# Patient Record
Sex: FEMALE | Race: ASIAN | Employment: FULL TIME | URBAN - METROPOLITAN AREA
[De-identification: names, ages, dates, MRNs, and addresses within clinical notes are randomized per-mention and may not be internally consistent; named-entity substitution may affect disease eponyms.]

---

## 2020-04-22 ENCOUNTER — LAB ENCOUNTER (OUTPATIENT)
Dept: LAB | Facility: HOSPITAL | Age: 40
End: 2020-04-22
Attending: INTERNAL MEDICINE
Payer: COMMERCIAL

## 2020-04-22 DIAGNOSIS — R05.9 COUGH: ICD-10-CM

## 2020-04-23 ENCOUNTER — HOSPITAL ENCOUNTER (OUTPATIENT)
Dept: GENERAL RADIOLOGY | Age: 40
Discharge: HOME | End: 2020-04-23
Attending: INTERNAL MEDICINE
Payer: COMMERCIAL

## 2020-04-23 DIAGNOSIS — R05.9 COUGH: ICD-10-CM

## 2020-04-23 PROCEDURE — 71046 X-RAY EXAM CHEST 2 VIEWS: CPT | Performed by: INTERNAL MEDICINE

## 2020-05-23 ENCOUNTER — HOSPITAL ENCOUNTER (OUTPATIENT)
Dept: CT IMAGING | Facility: HOSPITAL | Age: 40
Discharge: HOME | End: 2020-05-23
Attending: INTERNAL MEDICINE
Payer: COMMERCIAL

## 2020-05-23 DIAGNOSIS — R07.9 CHEST PAIN, UNSPECIFIED TYPE: ICD-10-CM

## 2020-05-23 DIAGNOSIS — Q26.8 SCIMITAR SYNDROME: ICD-10-CM

## 2020-05-23 PROCEDURE — 82565 ASSAY OF CREATININE: CPT

## 2020-05-23 PROCEDURE — 71260 CT THORAX DX C+: CPT | Performed by: INTERNAL MEDICINE

## 2020-09-28 ENCOUNTER — HOSPITAL ENCOUNTER (OUTPATIENT)
Dept: MAMMOGRAPHY | Facility: HOSPITAL | Age: 40
Discharge: HOME | End: 2020-09-28
Attending: INTERNAL MEDICINE
Payer: COMMERCIAL

## 2020-09-28 DIAGNOSIS — Z12.31 ENCOUNTER FOR SCREENING MAMMOGRAM FOR MALIGNANT NEOPLASM OF BREAST: ICD-10-CM

## 2020-09-28 PROCEDURE — 77063 BREAST TOMOSYNTHESIS BI: CPT | Performed by: INTERNAL MEDICINE

## 2020-09-28 PROCEDURE — 77067 SCR MAMMO BI INCL CAD: CPT | Performed by: INTERNAL MEDICINE

## 2021-03-18 ENCOUNTER — OFFICE VISIT (OUTPATIENT)
Dept: OBGYN CLINIC | Facility: CLINIC | Age: 41
End: 2021-03-18
Payer: COMMERCIAL

## 2021-03-18 VITALS
WEIGHT: 164 LBS | SYSTOLIC BLOOD PRESSURE: 118 MMHG | DIASTOLIC BLOOD PRESSURE: 64 MMHG | HEIGHT: 63 IN | BODY MASS INDEX: 29.06 KG/M2 | HEART RATE: 76 BPM

## 2021-03-18 DIAGNOSIS — Z98.890 HISTORY OF OPEN HEART SURGERY: ICD-10-CM

## 2021-03-18 DIAGNOSIS — Z31.69 ENCOUNTER FOR PRECONCEPTION CONSULTATION: ICD-10-CM

## 2021-03-18 DIAGNOSIS — Z30.09 EVALUATION REGARDING CONTRACEPTION OPTIONS: Primary | ICD-10-CM

## 2021-03-18 PROCEDURE — 3008F BODY MASS INDEX DOCD: CPT | Performed by: OBSTETRICS & GYNECOLOGY

## 2021-03-18 PROCEDURE — 3074F SYST BP LT 130 MM HG: CPT | Performed by: OBSTETRICS & GYNECOLOGY

## 2021-03-18 PROCEDURE — 99203 OFFICE O/P NEW LOW 30 MIN: CPT | Performed by: OBSTETRICS & GYNECOLOGY

## 2021-03-18 PROCEDURE — 3078F DIAST BP <80 MM HG: CPT | Performed by: OBSTETRICS & GYNECOLOGY

## 2021-03-18 RX ORDER — MELATONIN 10 MG
10000 TABLET, SUBLINGUAL SUBLINGUAL DAILY
Status: ON HOLD | COMMUNITY
End: 2021-09-27

## 2021-03-18 RX ORDER — MELATONIN
1000 DAILY
COMMUNITY

## 2021-03-18 RX ORDER — MULTIVIT WITH MINERALS/LUTEIN
1000 TABLET ORAL DAILY
COMMUNITY

## 2021-03-18 RX ORDER — FLUTICASONE PROPIONATE 50 MCG
2 SPRAY, SUSPENSION (ML) NASAL DAILY
COMMUNITY

## 2021-03-18 RX ORDER — LEVOTHYROXINE SODIUM 0.05 MG/1
50 TABLET ORAL EVERY MORNING
COMMUNITY
Start: 2021-07-26

## 2021-03-18 RX ORDER — PSYLLIUM HUSK 0.4 G
1 CAPSULE ORAL DAILY
Status: ON HOLD | COMMUNITY
End: 2021-09-24 | Stop reason: CLARIF

## 2021-03-18 NOTE — PROGRESS NOTES
Renetta Mock is a 47year old female  Patient's last menstrual period was 2021 (exact date). Patient presents with: Other: fertility  . First Covid shot is on Saturday. She does not want to start trying pregnancy until the second shot. Paying Living Expenses:   Food Insecurity:       Worried About [address] [employer] in the Last Year:       Ran Out of Food in the Last Year:   Transportation Needs:       Lack of Transportation (Medical):       Lack of Transportation (Non-Medical):   Physi Systems:  Constitutional:  Denies fatigue, night sweats, hot flashes  Gastrointestinal:  denies heartburn, abdominal pain, diarrhea or constipation  Genitourinary:  denies dysuria, incontinence, abnormal vaginal discharge, vaginal itching  Skin/Breast:  Clifford Marshall

## 2021-08-12 ENCOUNTER — OFFICE VISIT (OUTPATIENT)
Dept: OBGYN CLINIC | Facility: CLINIC | Age: 41
End: 2021-08-12
Payer: COMMERCIAL

## 2021-08-12 ENCOUNTER — TELEPHONE (OUTPATIENT)
Dept: OBGYN CLINIC | Facility: CLINIC | Age: 41
End: 2021-08-12

## 2021-08-12 VITALS
WEIGHT: 159 LBS | HEART RATE: 91 BPM | HEIGHT: 63 IN | DIASTOLIC BLOOD PRESSURE: 80 MMHG | BODY MASS INDEX: 28.17 KG/M2 | SYSTOLIC BLOOD PRESSURE: 120 MMHG

## 2021-08-12 DIAGNOSIS — N92.6 IRREGULAR MENSES: Primary | ICD-10-CM

## 2021-08-12 PROCEDURE — 87591 N.GONORRHOEAE DNA AMP PROB: CPT | Performed by: OBSTETRICS & GYNECOLOGY

## 2021-08-12 PROCEDURE — 99213 OFFICE O/P EST LOW 20 MIN: CPT | Performed by: OBSTETRICS & GYNECOLOGY

## 2021-08-12 PROCEDURE — 87491 CHLMYD TRACH DNA AMP PROBE: CPT | Performed by: OBSTETRICS & GYNECOLOGY

## 2021-08-12 PROCEDURE — 3079F DIAST BP 80-89 MM HG: CPT | Performed by: OBSTETRICS & GYNECOLOGY

## 2021-08-12 PROCEDURE — 3074F SYST BP LT 130 MM HG: CPT | Performed by: OBSTETRICS & GYNECOLOGY

## 2021-08-12 PROCEDURE — 3008F BODY MASS INDEX DOCD: CPT | Performed by: OBSTETRICS & GYNECOLOGY

## 2021-08-12 NOTE — PROGRESS NOTES
He has been trying to get pregnant since March. Her periods are 25 to 30 days. She is doing the ovulation predictor kits and they are not turning. We will do a day 21 progesterone. Semen analysis will be done.   She did not have any trouble getting preg

## 2021-08-13 LAB
C TRACH DNA SPEC QL NAA+PROBE: NEGATIVE
N GONORRHOEA DNA SPEC QL NAA+PROBE: NEGATIVE

## 2021-08-21 LAB
PROGESTERONE: 5.5 NG/ML
PROLACTIN: 12.7 NG/ML
TSH W/REFLEX TO FT4: 2.12 MIU/L

## 2021-08-24 ENCOUNTER — TELEPHONE (OUTPATIENT)
Dept: OBGYN CLINIC | Facility: CLINIC | Age: 41
End: 2021-08-24

## 2021-08-24 NOTE — PROGRESS NOTES
Pt advised of results and recommendation for reproductive endo and clomid. Understanding verbalized. Has already picked up clomid, instructions clarified and reproductive endo providers info provided. Understanding verbalized.

## 2021-09-03 ENCOUNTER — TELEPHONE (OUTPATIENT)
Dept: OBGYN CLINIC | Facility: CLINIC | Age: 41
End: 2021-09-03

## 2021-09-03 DIAGNOSIS — O20.9 VAGINAL BLEEDING IN PREGNANCY, FIRST TRIMESTER: Primary | ICD-10-CM

## 2021-09-03 NOTE — TELEPHONE ENCOUNTER
Patient is 5w0d today (LMP7/), , experiencing some vaginal bleeding x 2 days and mild cramping. Patient denies history of miscarriage. Message routed to provider for recommendation.

## 2021-09-03 NOTE — TELEPHONE ENCOUNTER
Patient has an ultrasound scheduled for September 21st.  She has been bleeding slightly for the last couple of days. Should be seen sooner? Please advise. Thank you!

## 2021-09-05 ENCOUNTER — TELEPHONE (OUTPATIENT)
Dept: OBGYN CLINIC | Facility: CLINIC | Age: 41
End: 2021-09-05

## 2021-09-05 DIAGNOSIS — O36.80X0 PREGNANCY WITH INCONCLUSIVE FETAL VIABILITY, SINGLE OR UNSPECIFIED FETUS: Primary | ICD-10-CM

## 2021-09-05 LAB
HCG, TOTAL, QN: 120 MIU/ML
PROGESTERONE: 1.1 NG/ML

## 2021-09-05 NOTE — TELEPHONE ENCOUNTER
Discussed HCG level result consistent with early pregnancy. Patient was bleeding on Friday-Saturday and has slowed down now to similar to light period.  Recommended repeat HCG level tomorrow AM (48h after first level) to see if trend consistent with miscarr

## 2021-09-08 LAB — PROGESTERONE: 1.6 NG/ML

## 2021-09-10 DIAGNOSIS — O20.0 THREATENED MISCARRIAGE: Primary | ICD-10-CM

## 2021-09-10 DIAGNOSIS — O36.80X0 PREGNANCY WITH INCONCLUSIVE FETAL VIABILITY, SINGLE OR UNSPECIFIED FETUS: Primary | ICD-10-CM

## 2021-09-10 NOTE — PROGRESS NOTES
Patient aware of progesterone level results. She did not have the second HCG drawn and was recommended to do so but would like clarification if she needs both HCG and progesterone.  Her bleeding has stopped but would like to make sure levels are trending in

## 2021-09-12 LAB
HCG, TOTAL, QN: 1999 MIU/ML
PROGESTERONE: 1.9 NG/ML

## 2021-09-21 ENCOUNTER — INITIAL PRENATAL (OUTPATIENT)
Dept: OBGYN CLINIC | Facility: CLINIC | Age: 41
End: 2021-09-21
Payer: COMMERCIAL

## 2021-09-21 ENCOUNTER — LAB ENCOUNTER (OUTPATIENT)
Dept: LAB | Age: 41
End: 2021-09-21
Attending: OBSTETRICS & GYNECOLOGY
Payer: COMMERCIAL

## 2021-09-21 ENCOUNTER — TELEPHONE (OUTPATIENT)
Dept: OBGYN CLINIC | Facility: CLINIC | Age: 41
End: 2021-09-21

## 2021-09-21 ENCOUNTER — ULTRASOUND ENCOUNTER (OUTPATIENT)
Dept: OBGYN CLINIC | Facility: CLINIC | Age: 41
End: 2021-09-21
Payer: COMMERCIAL

## 2021-09-21 VITALS
DIASTOLIC BLOOD PRESSURE: 64 MMHG | HEART RATE: 94 BPM | HEIGHT: 63 IN | WEIGHT: 162.19 LBS | SYSTOLIC BLOOD PRESSURE: 102 MMHG | BODY MASS INDEX: 28.74 KG/M2

## 2021-09-21 DIAGNOSIS — O00.102 LEFT TUBAL PREGNANCY WITHOUT INTRAUTERINE PREGNANCY: ICD-10-CM

## 2021-09-21 DIAGNOSIS — O36.80X0 PREGNANCY OF UNKNOWN ANATOMIC LOCATION: Primary | ICD-10-CM

## 2021-09-21 DIAGNOSIS — Z92.21 HISTORY OF METHOTREXATE THERAPY: Primary | ICD-10-CM

## 2021-09-21 DIAGNOSIS — O00.102 LEFT TUBAL PREGNANCY, UNSPECIFIED WHETHER INTRAUTERINE PREGNANCY PRESENT: ICD-10-CM

## 2021-09-21 DIAGNOSIS — O36.80X0 PREGNANCY WITH INCONCLUSIVE FETAL VIABILITY, SINGLE OR UNSPECIFIED FETUS: Primary | ICD-10-CM

## 2021-09-21 LAB
ALBUMIN SERPL-MCNC: 3.7 G/DL (ref 3.4–5)
ALBUMIN/GLOB SERPL: 0.9 {RATIO} (ref 1–2)
ALP LIVER SERPL-CCNC: 68 U/L
ALT SERPL-CCNC: 38 U/L
ANION GAP SERPL CALC-SCNC: 3 MMOL/L (ref 0–18)
AST SERPL-CCNC: 24 U/L (ref 15–37)
B-HCG SERPL-ACNC: ABNORMAL MIU/ML
BASOPHILS # BLD AUTO: 0.06 X10(3) UL (ref 0–0.2)
BASOPHILS NFR BLD AUTO: 0.6 %
BILIRUB SERPL-MCNC: 0.4 MG/DL (ref 0.1–2)
BUN BLD-MCNC: 10 MG/DL (ref 7–18)
CALCIUM BLD-MCNC: 9.7 MG/DL (ref 8.5–10.1)
CHLORIDE SERPL-SCNC: 105 MMOL/L (ref 98–112)
CO2 SERPL-SCNC: 30 MMOL/L (ref 21–32)
CREAT BLD-MCNC: 0.82 MG/DL
EOSINOPHIL # BLD AUTO: 0.11 X10(3) UL (ref 0–0.7)
EOSINOPHIL NFR BLD AUTO: 1.1 %
ERYTHROCYTE [DISTWIDTH] IN BLOOD BY AUTOMATED COUNT: 13.1 %
GLOBULIN PLAS-MCNC: 3.9 G/DL (ref 2.8–4.4)
GLUCOSE BLD-MCNC: 78 MG/DL (ref 70–99)
HCT VFR BLD AUTO: 38.8 %
HGB BLD-MCNC: 12.9 G/DL
IMM GRANULOCYTES # BLD AUTO: 0.03 X10(3) UL (ref 0–1)
IMM GRANULOCYTES NFR BLD: 0.3 %
LYMPHOCYTES # BLD AUTO: 2.52 X10(3) UL (ref 1–4)
LYMPHOCYTES NFR BLD AUTO: 26.2 %
MCH RBC QN AUTO: 30.1 PG (ref 26–34)
MCHC RBC AUTO-ENTMCNC: 33.2 G/DL (ref 31–37)
MCV RBC AUTO: 90.7 FL
MONOCYTES # BLD AUTO: 0.73 X10(3) UL (ref 0.1–1)
MONOCYTES NFR BLD AUTO: 7.6 %
NEUTROPHILS # BLD AUTO: 6.18 X10 (3) UL (ref 1.5–7.7)
NEUTROPHILS # BLD AUTO: 6.18 X10(3) UL (ref 1.5–7.7)
NEUTROPHILS NFR BLD AUTO: 64.2 %
OSMOLALITY SERPL CALC.SUM OF ELEC: 284 MOSM/KG (ref 275–295)
PATIENT FASTING Y/N/NP: NO
PLATELET # BLD AUTO: 467 10(3)UL (ref 150–450)
POTASSIUM SERPL-SCNC: 4.5 MMOL/L (ref 3.5–5.1)
PROT SERPL-MCNC: 7.6 G/DL (ref 6.4–8.2)
RBC # BLD AUTO: 4.28 X10(6)UL
RH BLOOD TYPE: POSITIVE
SODIUM SERPL-SCNC: 138 MMOL/L (ref 136–145)
WBC # BLD AUTO: 9.6 X10(3) UL (ref 4–11)

## 2021-09-21 PROCEDURE — 84702 CHORIONIC GONADOTROPIN TEST: CPT | Performed by: OBSTETRICS & GYNECOLOGY

## 2021-09-21 PROCEDURE — 36415 COLL VENOUS BLD VENIPUNCTURE: CPT | Performed by: OBSTETRICS & GYNECOLOGY

## 2021-09-21 PROCEDURE — 3074F SYST BP LT 130 MM HG: CPT | Performed by: OBSTETRICS & GYNECOLOGY

## 2021-09-21 PROCEDURE — 86900 BLOOD TYPING SEROLOGIC ABO: CPT | Performed by: OBSTETRICS & GYNECOLOGY

## 2021-09-21 PROCEDURE — 76817 TRANSVAGINAL US OBSTETRIC: CPT | Performed by: OBSTETRICS & GYNECOLOGY

## 2021-09-21 PROCEDURE — 85025 COMPLETE CBC W/AUTO DIFF WBC: CPT | Performed by: OBSTETRICS & GYNECOLOGY

## 2021-09-21 PROCEDURE — 99214 OFFICE O/P EST MOD 30 MIN: CPT | Performed by: OBSTETRICS & GYNECOLOGY

## 2021-09-21 PROCEDURE — 3078F DIAST BP <80 MM HG: CPT | Performed by: OBSTETRICS & GYNECOLOGY

## 2021-09-21 PROCEDURE — 3008F BODY MASS INDEX DOCD: CPT | Performed by: OBSTETRICS & GYNECOLOGY

## 2021-09-21 PROCEDURE — 86901 BLOOD TYPING SEROLOGIC RH(D): CPT | Performed by: OBSTETRICS & GYNECOLOGY

## 2021-09-21 PROCEDURE — 80053 COMPREHEN METABOLIC PANEL: CPT | Performed by: OBSTETRICS & GYNECOLOGY

## 2021-09-21 RX ORDER — PRENATAL VIT/IRON FUM/FOLIC AC 28MG-0.8MG
1 TABLET ORAL DAILY
Status: ON HOLD | COMMUNITY
End: 2021-09-24 | Stop reason: CLARIF

## 2021-09-21 NOTE — TELEPHONE ENCOUNTER
Pt is calling looking for test results and to be advised about an injection she states. Please advise.

## 2021-09-21 NOTE — PROGRESS NOTES
Sussy Christianson is a 46year old female  Patient's last menstrual period was 2021. Patient presents with:  Prenatal Care: FOB  . Patient is about 7 weeks 4 days pregnant by LMP - h/o regular menses  Ultrasound today showed no IUP  OBSTETRICS HISTO Lack of Transportation (Non-Medical):  Not on file  Physical Activity:       Days of Exercise per Week: Not on file      Minutes of Exercise per Session: Not on file  Stress:       Feeling of Stress : Not on file  Social Connections:       Frequency of Comm (MULTIVITAL) Oral Tab, Take 1 tablet by mouth daily. , Disp: , Rfl:     ALLERGIES:    Metronidazole           RASH, HIVES  Barber                   RASH  Seasonal                Runny nose, Coughing      PHYSICAL EXAM:   Pelvic Exam:  External Genitalia: nor

## 2021-09-21 NOTE — TELEPHONE ENCOUNTER
Patient aware of recommendations for Methorexate, she has appointment schedule with [facility] tomorrow morning. Patient will have follow up HCG on day 4 and day 7 post injection. Order placed. Patient verbalizes understanding.

## 2021-09-22 ENCOUNTER — OFFICE VISIT (OUTPATIENT)
Dept: HEMATOLOGY/ONCOLOGY | Facility: HOSPITAL | Age: 41
End: 2021-09-22
Attending: OBSTETRICS & GYNECOLOGY
Payer: COMMERCIAL

## 2021-09-22 VITALS
SYSTOLIC BLOOD PRESSURE: 116 MMHG | HEART RATE: 85 BPM | DIASTOLIC BLOOD PRESSURE: 73 MMHG | RESPIRATION RATE: 18 BRPM | BODY MASS INDEX: 29 KG/M2 | WEIGHT: 162.81 LBS | TEMPERATURE: 98 F | OXYGEN SATURATION: 99 %

## 2021-09-22 DIAGNOSIS — O00.102 LEFT TUBAL PREGNANCY WITHOUT INTRAUTERINE PREGNANCY: Primary | ICD-10-CM

## 2021-09-22 PROCEDURE — 96401 CHEMO ANTI-NEOPL SQ/IM: CPT

## 2021-09-22 NOTE — TELEPHONE ENCOUNTER
Spoke with patient. She had her dose of methotrexate this morning. Order pending will route for her HCG to be done on Sunday 9/26/21 and Wednesday 9/29/21. Understanding verbalized.

## 2021-09-22 NOTE — PROGRESS NOTES
Education Record    Learner:  Patient    Disease / Diagnosis: Methotrexate injection for ectopic pregnancy.     Barriers / Limitations:  None   Comments:    Method:  Discussion   Comments:    General Topics:  Plan of care reviewed   Comments:    Outcome:  S

## 2021-09-24 ENCOUNTER — HOSPITAL ENCOUNTER (INPATIENT)
Facility: HOSPITAL | Age: 41
LOS: 3 days | Discharge: HOME | DRG: 817 | End: 2021-09-27
Attending: EMERGENCY MEDICINE | Admitting: OBSTETRICS & GYNECOLOGY
Payer: COMMERCIAL

## 2021-09-24 ENCOUNTER — ANESTHESIA (OUTPATIENT)
Dept: SURGERY | Facility: HOSPITAL | Age: 41
DRG: 817 | End: 2021-09-24
Payer: COMMERCIAL

## 2021-09-24 ENCOUNTER — ANESTHESIA EVENT (OUTPATIENT)
Dept: SURGERY | Facility: HOSPITAL | Age: 41
DRG: 817 | End: 2021-09-24
Payer: COMMERCIAL

## 2021-09-24 DIAGNOSIS — O00.90 ECTOPIC PREGNANCY: ICD-10-CM

## 2021-09-24 DIAGNOSIS — O00.90 RUPTURED ECTOPIC PREGNANCY: Primary | ICD-10-CM

## 2021-09-24 PROBLEM — O00.102 RUPTURED LEFT TUBAL ECTOPIC PREGNANCY CAUSING HEMOPERITONEUM: Status: ACTIVE | Noted: 2021-09-24

## 2021-09-24 PROBLEM — K66.1 RUPTURED LEFT TUBAL ECTOPIC PREGNANCY CAUSING HEMOPERITONEUM (HCC): Status: ACTIVE | Noted: 2021-09-24

## 2021-09-24 PROBLEM — Z92.89 HISTORY OF TRANSFUSION: Status: ACTIVE | Noted: 2021-09-24

## 2021-09-24 PROBLEM — K66.1 RUPTURED LEFT TUBAL ECTOPIC PREGNANCY CAUSING HEMOPERITONEUM: Status: ACTIVE | Noted: 2021-09-24

## 2021-09-24 PROBLEM — Z92.21 HISTORY OF METHOTREXATE THERAPY: Status: ACTIVE | Noted: 2021-09-24

## 2021-09-24 PROBLEM — Z87.74 HISTORY OF CONGENITAL HEART DISEASE: Status: ACTIVE | Noted: 2021-09-24

## 2021-09-24 PROBLEM — O00.102 RUPTURED LEFT TUBAL ECTOPIC PREGNANCY CAUSING HEMOPERITONEUM (HCC): Status: ACTIVE | Noted: 2021-09-24

## 2021-09-24 PROBLEM — Z31.69 ENCOUNTER FOR PRECONCEPTION CONSULTATION: Status: RESOLVED | Noted: 2021-03-18 | Resolved: 2021-09-24

## 2021-09-24 PROBLEM — Z98.890 STATUS POST EXPLORATORY LAPAROTOMY: Status: ACTIVE | Noted: 2021-09-24

## 2021-09-24 PROBLEM — D64.9 ANEMIA: Status: ACTIVE | Noted: 2021-09-24

## 2021-09-24 PROCEDURE — 0WCJ0ZZ EXTIRPATION OF MATTER FROM PELVIC CAVITY, OPEN APPROACH: ICD-10-PCS | Performed by: OBSTETRICS & GYNECOLOGY

## 2021-09-24 PROCEDURE — 76942 ECHO GUIDE FOR BIOPSY: CPT | Performed by: ANESTHESIOLOGY

## 2021-09-24 PROCEDURE — 99291 CRITICAL CARE FIRST HOUR: CPT | Performed by: INTERNAL MEDICINE

## 2021-09-24 PROCEDURE — 59120 TREAT ECTOPIC PREGNANCY: CPT | Performed by: OBSTETRICS & GYNECOLOGY

## 2021-09-24 PROCEDURE — 99255 IP/OBS CONSLTJ NEW/EST HI 80: CPT | Performed by: OBSTETRICS & GYNECOLOGY

## 2021-09-24 PROCEDURE — 30233N1 TRANSFUSION OF NONAUTOLOGOUS RED BLOOD CELLS INTO PERIPHERAL VEIN, PERCUTANEOUS APPROACH: ICD-10-PCS | Performed by: INTERNAL MEDICINE

## 2021-09-24 PROCEDURE — 30233H1 TRANSFUSION OF NONAUTOLOGOUS WHOLE BLOOD INTO PERIPHERAL VEIN, PERCUTANEOUS APPROACH: ICD-10-PCS | Performed by: INTERNAL MEDICINE

## 2021-09-24 PROCEDURE — 10T20ZZ RESECTION OF PRODUCTS OF CONCEPTION, ECTOPIC, OPEN APPROACH: ICD-10-PCS | Performed by: OBSTETRICS & GYNECOLOGY

## 2021-09-24 PROCEDURE — 0UJD0ZZ INSPECTION OF UTERUS AND CERVIX, OPEN APPROACH: ICD-10-PCS | Performed by: OBSTETRICS & GYNECOLOGY

## 2021-09-24 PROCEDURE — 99291 CRITICAL CARE FIRST HOUR: CPT | Performed by: NURSE PRACTITIONER

## 2021-09-24 RX ORDER — ALBUMIN, HUMAN INJ 5% 5 %
SOLUTION INTRAVENOUS
Status: COMPLETED
Start: 2021-09-24 | End: 2021-09-24

## 2021-09-24 RX ORDER — ACETAMINOPHEN 500 MG
1000 TABLET ORAL EVERY 6 HOURS PRN
Status: ON HOLD | COMMUNITY
End: 2021-09-27

## 2021-09-24 RX ORDER — HYDROCODONE BITARTRATE AND ACETAMINOPHEN 5; 325 MG/1; MG/1
1 TABLET ORAL EVERY 4 HOURS PRN
Status: DISCONTINUED | OUTPATIENT
Start: 2021-09-24 | End: 2021-09-27

## 2021-09-24 RX ORDER — MEPERIDINE HYDROCHLORIDE 25 MG/ML
12.5 INJECTION INTRAMUSCULAR; INTRAVENOUS; SUBCUTANEOUS AS NEEDED
Status: DISCONTINUED | OUTPATIENT
Start: 2021-09-24 | End: 2021-09-24 | Stop reason: HOSPADM

## 2021-09-24 RX ORDER — DEXTROSE, SODIUM CHLORIDE, SODIUM LACTATE, POTASSIUM CHLORIDE, AND CALCIUM CHLORIDE 5; .6; .31; .03; .02 G/100ML; G/100ML; G/100ML; G/100ML; G/100ML
INJECTION, SOLUTION INTRAVENOUS CONTINUOUS
Status: DISCONTINUED | OUTPATIENT
Start: 2021-09-24 | End: 2021-09-24

## 2021-09-24 RX ORDER — HYDROCODONE BITARTRATE AND ACETAMINOPHEN 5; 325 MG/1; MG/1
2 TABLET ORAL EVERY 4 HOURS PRN
Status: DISCONTINUED | OUTPATIENT
Start: 2021-09-24 | End: 2021-09-27

## 2021-09-24 RX ORDER — SODIUM CHLORIDE, SODIUM LACTATE, POTASSIUM CHLORIDE, CALCIUM CHLORIDE 600; 310; 30; 20 MG/100ML; MG/100ML; MG/100ML; MG/100ML
INJECTION, SOLUTION INTRAVENOUS CONTINUOUS PRN
Status: DISCONTINUED | OUTPATIENT
Start: 2021-09-24 | End: 2021-09-24 | Stop reason: SURG

## 2021-09-24 RX ORDER — PHENYLEPHRINE HCL 10 MG/ML
VIAL (ML) INJECTION AS NEEDED
Status: DISCONTINUED | OUTPATIENT
Start: 2021-09-24 | End: 2021-09-24 | Stop reason: SURG

## 2021-09-24 RX ORDER — LABETALOL HYDROCHLORIDE 5 MG/ML
5 INJECTION, SOLUTION INTRAVENOUS EVERY 5 MIN PRN
Status: DISCONTINUED | OUTPATIENT
Start: 2021-09-24 | End: 2021-09-24 | Stop reason: HOSPADM

## 2021-09-24 RX ORDER — ZOLPIDEM TARTRATE 5 MG/1
5 TABLET ORAL NIGHTLY PRN
Status: DISCONTINUED | OUTPATIENT
Start: 2021-09-24 | End: 2021-09-25

## 2021-09-24 RX ORDER — IBUPROFEN 200 MG
200 TABLET ORAL EVERY 4 HOURS PRN
Status: DISCONTINUED | OUTPATIENT
Start: 2021-09-24 | End: 2021-09-24

## 2021-09-24 RX ORDER — ONDANSETRON 2 MG/ML
4 INJECTION INTRAMUSCULAR; INTRAVENOUS AS NEEDED
Status: DISCONTINUED | OUTPATIENT
Start: 2021-09-24 | End: 2021-09-24 | Stop reason: HOSPADM

## 2021-09-24 RX ORDER — CEFAZOLIN SODIUM 1 G/3ML
INJECTION, POWDER, FOR SOLUTION INTRAMUSCULAR; INTRAVENOUS AS NEEDED
Status: DISCONTINUED | OUTPATIENT
Start: 2021-09-24 | End: 2021-09-24 | Stop reason: SURG

## 2021-09-24 RX ORDER — NALOXONE HYDROCHLORIDE 0.4 MG/ML
80 INJECTION, SOLUTION INTRAMUSCULAR; INTRAVENOUS; SUBCUTANEOUS AS NEEDED
Status: DISCONTINUED | OUTPATIENT
Start: 2021-09-24 | End: 2021-09-24 | Stop reason: HOSPADM

## 2021-09-24 RX ORDER — HYDROMORPHONE HYDROCHLORIDE 1 MG/ML
0.4 INJECTION, SOLUTION INTRAMUSCULAR; INTRAVENOUS; SUBCUTANEOUS EVERY 2 HOUR PRN
Status: DISCONTINUED | OUTPATIENT
Start: 2021-09-24 | End: 2021-09-27

## 2021-09-24 RX ORDER — SODIUM CHLORIDE, SODIUM LACTATE, POTASSIUM CHLORIDE, CALCIUM CHLORIDE 600; 310; 30; 20 MG/100ML; MG/100ML; MG/100ML; MG/100ML
INJECTION, SOLUTION INTRAVENOUS CONTINUOUS
Status: DISCONTINUED | OUTPATIENT
Start: 2021-09-24 | End: 2021-09-24 | Stop reason: HOSPADM

## 2021-09-24 RX ORDER — ALBUMIN, HUMAN INJ 5% 5 %
250 SOLUTION INTRAVENOUS ONCE
Status: COMPLETED | OUTPATIENT
Start: 2021-09-24 | End: 2021-09-24

## 2021-09-24 RX ORDER — ROCURONIUM BROMIDE 10 MG/ML
INJECTION, SOLUTION INTRAVENOUS AS NEEDED
Status: DISCONTINUED | OUTPATIENT
Start: 2021-09-24 | End: 2021-09-24 | Stop reason: SURG

## 2021-09-24 RX ORDER — ACETAMINOPHEN 325 MG/1
650 TABLET ORAL EVERY 4 HOURS PRN
Status: DISCONTINUED | OUTPATIENT
Start: 2021-09-24 | End: 2021-09-27

## 2021-09-24 RX ORDER — ONDANSETRON 2 MG/ML
4 INJECTION INTRAMUSCULAR; INTRAVENOUS EVERY 8 HOURS PRN
Status: DISCONTINUED | OUTPATIENT
Start: 2021-09-24 | End: 2021-09-27

## 2021-09-24 RX ORDER — HYDROMORPHONE HYDROCHLORIDE 1 MG/ML
0.8 INJECTION, SOLUTION INTRAMUSCULAR; INTRAVENOUS; SUBCUTANEOUS EVERY 2 HOUR PRN
Status: DISCONTINUED | OUTPATIENT
Start: 2021-09-24 | End: 2021-09-27

## 2021-09-24 RX ORDER — HYDROMORPHONE HYDROCHLORIDE 1 MG/ML
0.4 INJECTION, SOLUTION INTRAMUSCULAR; INTRAVENOUS; SUBCUTANEOUS EVERY 5 MIN PRN
Status: DISCONTINUED | OUTPATIENT
Start: 2021-09-24 | End: 2021-09-24 | Stop reason: HOSPADM

## 2021-09-24 RX ORDER — DEXAMETHASONE SODIUM PHOSPHATE 4 MG/ML
VIAL (ML) INJECTION AS NEEDED
Status: DISCONTINUED | OUTPATIENT
Start: 2021-09-24 | End: 2021-09-24 | Stop reason: SURG

## 2021-09-24 RX ORDER — HYDROCODONE BITARTRATE AND ACETAMINOPHEN 5; 325 MG/1; MG/1
2 TABLET ORAL AS NEEDED
Status: DISCONTINUED | OUTPATIENT
Start: 2021-09-24 | End: 2021-09-24 | Stop reason: HOSPADM

## 2021-09-24 RX ORDER — ONDANSETRON 2 MG/ML
INJECTION INTRAMUSCULAR; INTRAVENOUS AS NEEDED
Status: DISCONTINUED | OUTPATIENT
Start: 2021-09-24 | End: 2021-09-24 | Stop reason: SURG

## 2021-09-24 RX ORDER — ONDANSETRON 4 MG/1
4 TABLET, FILM COATED ORAL EVERY 8 HOURS PRN
Status: DISCONTINUED | OUTPATIENT
Start: 2021-09-24 | End: 2021-09-27

## 2021-09-24 RX ORDER — IBUPROFEN 600 MG/1
600 TABLET ORAL EVERY 4 HOURS PRN
Status: DISCONTINUED | OUTPATIENT
Start: 2021-09-24 | End: 2021-09-24

## 2021-09-24 RX ORDER — SODIUM CHLORIDE 9 MG/ML
INJECTION, SOLUTION INTRAVENOUS ONCE
Status: COMPLETED | OUTPATIENT
Start: 2021-09-24 | End: 2021-09-24

## 2021-09-24 RX ORDER — IBUPROFEN 400 MG/1
400 TABLET ORAL EVERY 4 HOURS PRN
Status: DISCONTINUED | OUTPATIENT
Start: 2021-09-24 | End: 2021-09-24

## 2021-09-24 RX ORDER — SODIUM CHLORIDE 9 MG/ML
INJECTION, SOLUTION INTRAVENOUS CONTINUOUS
Status: DISCONTINUED | OUTPATIENT
Start: 2021-09-24 | End: 2021-09-24

## 2021-09-24 RX ORDER — HYDROMORPHONE HYDROCHLORIDE 1 MG/ML
0.2 INJECTION, SOLUTION INTRAMUSCULAR; INTRAVENOUS; SUBCUTANEOUS EVERY 2 HOUR PRN
Status: DISCONTINUED | OUTPATIENT
Start: 2021-09-24 | End: 2021-09-27

## 2021-09-24 RX ORDER — HYDROCODONE BITARTRATE AND ACETAMINOPHEN 5; 325 MG/1; MG/1
1 TABLET ORAL AS NEEDED
Status: DISCONTINUED | OUTPATIENT
Start: 2021-09-24 | End: 2021-09-24 | Stop reason: HOSPADM

## 2021-09-24 RX ORDER — MIDAZOLAM HYDROCHLORIDE 1 MG/ML
1 INJECTION INTRAMUSCULAR; INTRAVENOUS EVERY 5 MIN PRN
Status: DISCONTINUED | OUTPATIENT
Start: 2021-09-24 | End: 2021-09-24 | Stop reason: HOSPADM

## 2021-09-24 RX ORDER — METOCLOPRAMIDE HYDROCHLORIDE 5 MG/ML
INJECTION INTRAMUSCULAR; INTRAVENOUS AS NEEDED
Status: DISCONTINUED | OUTPATIENT
Start: 2021-09-24 | End: 2021-09-24 | Stop reason: SURG

## 2021-09-24 RX ORDER — SODIUM CHLORIDE, SODIUM LACTATE, POTASSIUM CHLORIDE, CALCIUM CHLORIDE 600; 310; 30; 20 MG/100ML; MG/100ML; MG/100ML; MG/100ML
INJECTION, SOLUTION INTRAVENOUS CONTINUOUS
Status: DISCONTINUED | OUTPATIENT
Start: 2021-09-24 | End: 2021-09-27

## 2021-09-24 RX ORDER — CETIRIZINE HYDROCHLORIDE 10 MG/1
10 TABLET ORAL DAILY
COMMUNITY

## 2021-09-24 RX ADMIN — PHENYLEPHRINE HCL 200 MCG: 10 MG/ML VIAL (ML) INJECTION at 15:13:00

## 2021-09-24 RX ADMIN — ROCURONIUM BROMIDE 50 MG: 10 INJECTION, SOLUTION INTRAVENOUS at 15:25:00

## 2021-09-24 RX ADMIN — CEFAZOLIN SODIUM 2 G: 1 INJECTION, POWDER, FOR SOLUTION INTRAMUSCULAR; INTRAVENOUS at 15:22:00

## 2021-09-24 RX ADMIN — SODIUM CHLORIDE, SODIUM LACTATE, POTASSIUM CHLORIDE, CALCIUM CHLORIDE: 600; 310; 30; 20 INJECTION, SOLUTION INTRAVENOUS at 15:12:00

## 2021-09-24 RX ADMIN — DEXAMETHASONE SODIUM PHOSPHATE 8 MG: 4 MG/ML VIAL (ML) INJECTION at 15:25:00

## 2021-09-24 RX ADMIN — ONDANSETRON 4 MG: 2 INJECTION INTRAMUSCULAR; INTRAVENOUS at 16:16:00

## 2021-09-24 RX ADMIN — METOCLOPRAMIDE HYDROCHLORIDE 10 MG: 5 INJECTION INTRAMUSCULAR; INTRAVENOUS at 15:25:00

## 2021-09-24 NOTE — ANESTHESIA PROCEDURE NOTES
[facility]  Performed by: Young Matos MD  Authorized by: Young Matos MD       General Information and Staff    Start Time:   Anesthesiologist: Young Matos MD  Patient Location:  OR      Site Identification: real time ultrasound guided and image s

## 2021-09-24 NOTE — OPERATIVE REPORT
Operative Note    Preop diagnosis: Hemoperitoneum, ruptured ectopic, hemodynamically unstable    Postop diagnosis: Same  Procedure:  Exploratory laparotomy, evacuation of hemoperitoneum, left salpingectomy    Surgeon:  Zuleima Ramírez  Assistant:  Cezar Delarosa

## 2021-09-24 NOTE — ANESTHESIA PROCEDURE NOTES
Airway  Date/Time: 9/24/2021 3:15 PM  Urgency: elective      General Information and Staff    Patient location during procedure: OR  Anesthesiologist: Alondra Kim MD  Performed: anesthesiologist     Indications and Patient Condition  Indications for airw

## 2021-09-24 NOTE — ED INITIAL ASSESSMENT (HPI)
Pt had a syncopal episode. Pt was treated earlier in the week for an ectopic pregnancy in which she received methotrexate.

## 2021-09-24 NOTE — ANESTHESIA POSTPROCEDURE EVALUATION
[id number] [facility] Patient Status:  Inpatient   Age/Gender 46year old female MRN [id number]   [facility] SURGERY Attending Darío Colon, [address] [facility] Se Day # 0 PCP Louise Hermosillo MD       Anesthesia Post-op Note     EXPLORATORY LAPAROTOM

## 2021-09-24 NOTE — PROGRESS NOTES
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 Patient underwent open heart surgery at [facility] for this.         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

## 2021-09-24 NOTE — ANESTHESIA PROCEDURE NOTES
Arterial Line  Performed by: Sinan Lo MD  Authorized by: Sinan Lo MD     General Information and Staff    Procedure Start:   Anesthesiologist: Sinan Lo MD  Performed By:  Anesthesiologist  Patient Location:  OR  Indication: continuous bloo

## 2021-09-24 NOTE — ANESTHESIA PREPROCEDURE EVALUATION
PRE-OP EVALUATION    Patient Name: Juel Frankel    Admit Diagnosis: No admission diagnoses are documented for this encounter.     Pre-op Diagnosis: Ectopic pregnancy [O00.90]    EXPLORATORY LAPAROTOMY      Anesthesia Procedure: EXPLORATORY LAPAROTOMY  (N/A II  Mouth opening: >3 FB  TM distance: 4 - 6 cm  Neck ROM: full Cardiovascular    Cardiovascular exam normal.         Dental    No notable dental history.          Pulmonary    Pulmonary exam normal.                 Other findings            ASA: 2 and juan

## 2021-09-24 NOTE — PACU NOTE
Pt arrived from OR, with SBP 80's. Instructed by anesthesia to administer 500ml IV bolus as ordered. IVF bolus of 500ml administered. Anesthesia, Dr. Myles Sinks notified of results/ SBP readings.   Instructions to administer second IVF bolus of 500ml receive

## 2021-09-24 NOTE — CONSULTS
[facility]  Report of Consultation    Naomi Coles Patient Status:  Emergency    1980 MRN [id number]   Location [address] [facility] Attending Sandra Harley MD   Hosp Day # 0 PCP Juan José Tony MD     Reason for Consultation: history of STD's or PID. No gyn procedures.   Last PAP normal      OB History:       Allergies:    Metronidazole           RASH, HIVES  O'Brien                   RASH  Seasonal                Runny nose, Coughing    Medications:  No current facility-admi

## 2021-09-24 NOTE — ED PROVIDER NOTES
Patient Seen in: [facility] Emergency Department      History   Patient presents with: Other    Stated Complaint:     Subjective:   HPI    Patient is a 22-year-old female received methotrexate for a ectopic pregnancy 3 days ago.   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) at [facility]   • SALPINGECTOMY Left 09/23/2021    Ex-laparotomy, LEF other components within normal limits   HCG, BETA SUBUNIT (QUANT PREGNANCY TEST) - Abnormal; Notable for the following components:    Hcg Quantitative 4,394.0 (*)     All other components within normal limits   HEMOGLOBIN + HEMATOCRIT - Abnormal; Notable f Absolute 19.75 (*)     Lymphocyte Absolute 0.81 (*)     All other components within normal limits   RAPID SARS-COV-2 BY PCR - Normal   CBC WITH DIFFERENTIAL WITH PLATELET    Narrative:      The following orders were created for panel order CBC With Differen abdomen. She received fluids in the field by paramedics initially had a response. She is now with a systolic in the 48V. She received IV fluids and remains hypotensive. 2 units of O- ordered for transfusion.   OB emergently notified on the way to the ER

## 2021-09-25 PROBLEM — E03.8 SECONDARY HYPOTHYROIDISM: Status: ACTIVE | Noted: 2021-09-25

## 2021-09-25 RX ORDER — CALCIUM CARBONATE 500(1250)
500 TABLET ORAL 2 TIMES DAILY WITH MEALS
Status: DISCONTINUED | OUTPATIENT
Start: 2021-09-25 | End: 2021-09-27

## 2021-09-25 RX ORDER — POLYETHYLENE GLYCOL 3350 17 G/17G
17 POWDER, FOR SOLUTION ORAL DAILY PRN
Status: DISCONTINUED | OUTPATIENT
Start: 2021-09-25 | End: 2021-09-27

## 2021-09-25 RX ORDER — FLUTICASONE PROPIONATE 50 MCG
2 SPRAY, SUSPENSION (ML) NASAL NIGHTLY
Status: DISCONTINUED | OUTPATIENT
Start: 2021-09-25 | End: 2021-09-27

## 2021-09-25 RX ORDER — LEVOTHYROXINE SODIUM 0.05 MG/1
50 TABLET ORAL
Status: DISCONTINUED | OUTPATIENT
Start: 2021-09-25 | End: 2021-09-27

## 2021-09-25 RX ORDER — MELATONIN
3 NIGHTLY PRN
Status: DISCONTINUED | OUTPATIENT
Start: 2021-09-25 | End: 2021-09-27

## 2021-09-25 RX ORDER — DOCUSATE SODIUM 100 MG/1
100 CAPSULE, LIQUID FILLED ORAL 2 TIMES DAILY
Status: DISCONTINUED | OUTPATIENT
Start: 2021-09-25 | End: 2021-09-27

## 2021-09-25 RX ORDER — FLUTICASONE PROPIONATE 50 MCG
2 SPRAY, SUSPENSION (ML) NASAL DAILY
Status: DISCONTINUED | OUTPATIENT
Start: 2021-09-25 | End: 2021-09-25

## 2021-09-25 NOTE — PLAN OF CARE
Patient is alert and oriented. Complaining of mild to moderate lower abdominal incisional pain that becomes worse with movement. Abdominal dressing clean dry and intact. Controlled with Norco and Tylenol.  One dose dilauded given the first time the patient

## 2021-09-25 NOTE — PACU NOTE
Pt's SBP continues in 80\"s, Dr. Lu Anderson and Dr. Arminda Gloria notified. Orders received for pt to be admitted to ICU for observation, stat H& H, and consult intent/hospitalist on call. Report given to Miguel Angel Hines RN in ICU. Pt resting comfortably.  Denies any pain or

## 2021-09-25 NOTE — PROGRESS NOTES
[facility] Progress Note      Betito Solano Patient Status:  Inpatient    1980 MRN [id number]   [facility] 4SW-A Attending Nacho Daily MD   Hosp Day # 1 PCP Randy Garcia MD     Subjective:   Subjective:  Patient with left ectopic tubal pr murmur. Lungs: CTA. No R/W/R  Abdomen: Soft, horizontal surgical incision  Extremities: No edema  Neurologic: no focal deficits  Skin: Warm and dry.      Results:   Labs:    Hematology:  Recent Labs   Lab 09/21/21  1032 09/21/21  1032 09/24/21  1422 09/24/2 and transfuse additional PRBCs for Hb < 7  -Arterial line in place, wean Norepinephrine as able to maintain MAP  >65  3. Secondary hypothyroidism   -Synthroid   4.  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

## 2021-09-25 NOTE — PROGRESS NOTES
Visited patient this am. Per RN, Dr Otto Larios (patient's PCP) will be taking over patient's care from today. Will sign off.      Gino Dixon MD  [facility]

## 2021-09-25 NOTE — H&P
[facility]  History and Physical     Shaeovidiorachele Melania Patient Status:  Inpatient    1980 MRN [id number]   [facility] 4SW-A Attending Svitlana Almazan MD   Hosp Day # 0 PCP Leela Piña MD     Chief Complaint: Ruptured Ectopic Pregnan lung collapse in ~ 2013 and evaluation during this showed a congenital abnormality in the heart    • History of open heart surgery 2014    for Scimitar syndrome   • Hx of transfusion of packed red blood cells 09/24/2021    for ruptured ectopic pregnancy Paternal Grandfather    • Obesity Sister    • No Known Problems Daughter    • Thyroid disease Neg        Allergies:   Metronidazole           RASH, HIVES  Kerr                   RASH  Seasonal                Runny nose, Coughing    Medications:  Casie mcgill organomegaly. Neurologic: No focal neurological deficits. CNII-XII grossly intact. Musculoskeletal: Moves all extremities. Extremities: No edema or cyanosis. Integument: No rashes or lesions. Psychiatric: Appropriate mood and affect.       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 Baby ASA on hold  5.  Allergic Rhinitis - Flonase      Quality:  · DVT Prophylaxis: SCDs  · CODE status: Full  · Ballard: no  ·

## 2021-09-25 NOTE — CONSULTS
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) at [facility]   • SALPINGECTOMY Left 09/23/2021    Ex-laparotomy, LEFT salpingectomy, evacuation of hemoperitoneum for LEFT sided ruptured ectopic pregna Other Topics      Concerns:        Not on file    Social History Narrative      Not on file    Social Determinants of Health  Financial Resource Strain:       Difficulty of Paying Living Expenses: Not on file  Food Insecurity:       Worried About Running O 09/25/21 0752     PRN Medication:melatonin, acetaminophen **OR** HYDROcodone-acetaminophen **OR** HYDROcodone-acetaminophen, ondansetron **OR** ondansetron, HYDROmorphone HCl **OR** HYDROmorphone HCl **OR** HYDROmorphone HCl     REVIEW OF SYSTEMS:   GENERA clubbing, no cyanosis or edema.    Skin:  No rashes or lesions   Lymph nodes: Cervical, supraclavicular nodes normal.   Neurologic:  no focal deficits     Recent Results (from the past 24 hour(s))   Comp Metabolic Panel (14)    Collection Time: 09/24/21  2: Immature Granulocyte % 0.4 %   ABORH (Blood Type)    Collection Time: 09/24/21  2:22 PM   Result Value Ref Range    ABO BLOOD TYPE O     RH BLOOD TYPE Positive    Antibody Screen    Collection Time: 09/24/21  2:22 PM   Result Value Ref Range    Antibody Sc 10 7 - 18 mg/dL    Creatinine 0.62 0.55 - 1.02 mg/dL    Calcium, Total 7.2 (L) 8.5 - 10.1 mg/dL    Calculated Osmolality 286 275 - 295 mOsm/kg    GFR, Non- 114 >=60    GFR, -American 131 >=60    AST 16 15 - 37 U/L    ALT 28 13 - 56 U ABG Base Excess -6.1 (L) -2.0 - 2.0 mmol/L    ABG O2 Saturation 96 92 - 100 %    Calculated O2 Saturation 97 92 - 100 %    Patient Temperature 98.5 F    Total Hemoglobin 9.5 (L) 11.7 - 15.3 g/dL    Carboxyhemoglobin 1.1 0.0 - 3.0 % SAT    Methemoglobin 0.7 0.00 - 1.00 x10(3) uL    Neutrophil % 88.4 %    Lymphocyte % 6.6 %    Monocyte % 4.6 %    Eosinophil % 0.0 %    Basophil % 0.1 %    Immature Granulocyte % 0.3 %           ASSESSMENT/PLAN:    1. hypotension due to acute blood loss anemia - resolved   2.  Acu

## 2021-09-25 NOTE — PROGRESS NOTES
Clinic hours for Dr. Pollock:  Monday    In Surgery  Tuesday 7:30am - 4:30pm  Wednesday 7:30am - 4:30pm  Thursday       7:30am - 4:30pm  Friday  7:30 - 11am    If you need a refill on your prescription, please call your pharmacy and let them know. Please be proactive and call before your medication runs out. The pharmacy will then contact us for the refill. Please allow 24-48 hours for the refill to be processed.     If your Specialty Provider has ordered additional laboratory or radiology testing the results will be discussed at your next visit. This will allow you the opportunity to go over the results in person with your provider. If your results require immediate intervention, you will be contacted sooner by phone call.    You may be receiving a patient satisfaction survey in the mail or in your email.  If you receive an email survey, please look for the subject line of:   \" Your provider name\" would like your feedback\".   Please take the time to complete your survey either via the mail or email, as your feedback is very important to us.  We strive to make your experience exceptional and your comments help us with that goal.  We look forward to hearing from you.            GYN progress note    A/P: 46year old  now POD#1 s/p Ex-laparotomy, LEFT salpingectomy, evacuation of 700 mL hemoperitoneum for LEFT sided ruptured ectopic pregnancy. Admitted to ICU postop    Tiigi 34 on case. Appreciate help. 11.0 x10(3) uL 12.2 (H)     RBC      3.80 - 5.30 x10(6)uL 2.78 (L)     Hemoglobin      12.0 - 16.0 g/dL 8.0 (L) 9.0 (L)    Hematocrit      35.0 - 48.0 % 24.6 (L)     Platelet Count      157.7 - 450.0 10(3)uL 286.0     MCV      80.0 - 100.0 fL 88.5     MCH - 100 %   96   CALCULATED O2 SAT      92 - 100 %   97   PATIENT TEMPERATURE      F   98.5   TOTAL HEMOGLOBIN      11.7 - 15.3 g/dL   9.5 (L)   CARBOXYHEMOGLOBIN      0.0 - 3.0 % SAT   1.1   METHEMOGLOBIN      0.4 - 1.5 % SAT   0.7   ALLENS TEST         Not 275 - 295 mOsm/kg  286    eGFR NON-AFR.  AMERICAN      >=60  114    eGFR       >=60  131    AST (SGOT)      15 - 37 U/L  16    ALT (SGPT)      13 - 56 U/L  28    ALKALINE PHOSPHATASE      37 - 98 U/L  44    Total Bilirubin      0.1 - 2.0 mg/ Yes...

## 2021-09-25 NOTE — PLAN OF CARE
Received pt from PACU, s/p exploratory lap, patient is alert and oriented x 4, at first denies pain, during the night complains of incisional pain and dull, aching pain under right breast, patient stated she has chronic pain to right side since cabg surger for signs of decreased coronary artery perfusion - ex.  Angina  - Evaluate fluid balance, assess for edema, trend weights  Outcome: Progressing     Problem: GENITOURINARY - ADULT  Goal: Absence of urinary retention  Description: INTERVENTIONS:  - Assess pat

## 2021-09-26 RX ORDER — POTASSIUM CHLORIDE 20 MEQ/1
40 TABLET, EXTENDED RELEASE ORAL EVERY 4 HOURS
Status: COMPLETED | OUTPATIENT
Start: 2021-09-26 | End: 2021-09-26

## 2021-09-26 NOTE — PLAN OF CARE
Problem: Patient/Family Goals  Goal: Patient/Family Long Term Goal  Description: Patient's Long Term Goal: leave hospital    Interventions:  -   - See additional Care Plan goals for specific interventions  Outcome: Progressing  Goal: Patient/Family Short

## 2021-09-26 NOTE — PROGRESS NOTES
GYN progress note    A/P: 46year old  now POD#2 s/p ex-laparotomy, LEFT salpingectomy, evacuation of 700 mL hemoperitoneum for LEFT sided ruptured ectopic pregnancy. Admitted to ICU postop    Hospitalist, Intensivist, Pulmonologist on case.  Heather Velasco nontender, no edema,not currently wearing SCDs    Component      Latest Ref Rng & Units 9/26/2021 9/26/2021           5:20 AM  5:20 AM   WBC      4.0 - 11.0 x10(3) uL  11.4 (H)   RBC      3.80 - 5.30 x10(6)uL  2.53 (L)   Hemoglobin      12.0 - 16.0 g/dL 7.

## 2021-09-26 NOTE — PROGRESS NOTES
[facility] Progress Note      Juel Frankel Patient Status:  Inpatient    1980 MRN [id number]   [facility] 3NW-A Attending Milena Roberson MD   Hosp Day # 2 PCP Raenell Siemens, MD     Subjective:   Subjective:    ROS:  No SOB.   Medications:  • i °C)-99.1 °F (37.3 °C)] 98.1 °F (36.7 °C)  Pulse:  [66-91] 77  Resp:  [14-36] 18  BP: ()/() 93/60    General: In mild distress  HEENT: No focal deficits. Neck: supple. JVP normal  Cardiac: Regular rhythm, S1, S2 normal,  rub or gallop.   No murm hypothyroidism  Weakness    Slow drift in hemoglobin. Plan:  1. Ruptured Ectopic pregnancy s/p exploratory laparotomy and evacuation of hemoperitoneum on admission 9/24  -OB/GYN following  2.  Hemorrhagic shock, Acute blood loss Anemia   -Hemoglobin is sta

## 2021-09-26 NOTE — PLAN OF CARE
Patient alert and oriented and complaining of mild pain to lower abdominal incision. Dressing is clean dry and intact. Patient voided in bathroom and is eating breakfast up in the chair. Blood pressure is stable. Patient on room air.  Report was called to JOSE

## 2021-09-26 NOTE — PLAN OF CARE
Received patient sitting in the chair visiting with her , patient is in good spirit, pain down to 1/10, moves all extremities, incentive spirometry up to 1250 x 10. Patient walked to the bathroom, did urinate. Lower abdominal dressing clean, dry. Monitor labs and vital signs for trends  - Administer supportive blood products/factors, fluids and medications as ordered and appropriate  - Administer supportive blood products/factors as ordered and appropriate  Outcome: Progressing

## 2021-09-26 NOTE — PROGRESS NOTES
Transfer received earlier today from icu, ambulated in room, scds on when not up. Medicated for pain with good relief.  Pt has a small amt of vaginal bleeding

## 2021-09-27 VITALS
BODY MASS INDEX: 30.39 KG/M2 | WEIGHT: 171.5 LBS | HEART RATE: 69 BPM | SYSTOLIC BLOOD PRESSURE: 107 MMHG | OXYGEN SATURATION: 96 % | TEMPERATURE: 98 F | DIASTOLIC BLOOD PRESSURE: 64 MMHG | RESPIRATION RATE: 18 BRPM | HEIGHT: 63 IN

## 2021-09-27 RX ORDER — PSEUDOEPHEDRINE HCL 30 MG
100 TABLET ORAL 2 TIMES DAILY
Refills: 0 | Status: SHIPPED | COMMUNITY
Start: 2021-09-27

## 2021-09-27 RX ORDER — MELATONIN
Qty: 30 TABLET | Refills: 0 | Status: SHIPPED | COMMUNITY
Start: 2021-09-27

## 2021-09-27 RX ORDER — MELATONIN 10 MG
5000 TABLET, SUBLINGUAL SUBLINGUAL DAILY
Qty: 30 TABLET | Refills: 0 | Status: SHIPPED | COMMUNITY
Start: 2021-09-27

## 2021-09-27 RX ORDER — HYDROCODONE BITARTRATE AND ACETAMINOPHEN 5; 325 MG/1; MG/1
1-2 TABLET ORAL EVERY 4 HOURS PRN
Qty: 20 TABLET | Refills: 0 | Status: SHIPPED | OUTPATIENT
Start: 2021-09-27

## 2021-09-27 RX ORDER — ACETAMINOPHEN 500 MG
1000 TABLET ORAL EVERY 4 HOURS PRN
Refills: 0 | Status: SHIPPED | COMMUNITY
Start: 2021-09-27

## 2021-09-27 NOTE — PROGRESS NOTES
NURSING DISCHARGE NOTE    Discharged Home via Ambulatory. Accompanied by Family member  Belongings Taken by patient/family       . A&O x4. VSS. RA.   GI: Abdomen soft, non distended. Passing gas. Denies nausea.   : Voiding  Pain: controlled with PRN

## 2021-09-27 NOTE — PLAN OF CARE
Received patient awake and oriented. On room air, breath sounds clear. Up to the bathroom with assist, scant vaginal bleeding noted. Dressing to abdomen dry and intact. Medicated with Tylenol per request for c/o pain.

## 2021-09-27 NOTE — PROGRESS NOTES
POD#3  Patient tolerates diet, ambulates, pain is controlled    /64 (BP Location: Right arm)   Pulse 69   Temp 98.1 °F (36.7 °C) (Oral)   Resp 18   Ht 63\"   Wt 171 lb 8.3 oz (77.8 kg)   LMP 07/30/2021   SpO2 96%   BMI 30.38 kg/m²     Recent Labs   L

## 2021-09-27 NOTE — PAYOR COMM NOTE
--------------  ADMISSION REVIEW     Payor: Fredericka Fabry #:  [id number]  Authorization Number: [id number]    Admit date: 9/24/21  Admit time:  3:01 PM       REVIEW DOCUMENTATION:     ED Provider Notes      ED Provider Notes signed by Ashlie Rocha pregnancy on 9/2/2021 ->presented in hypovolemic shock with ruptured ectopic pregnancy 9/24/2021 -> to OR for ex-lap, left salpingectomy, evacuation of hemoperitoneum.  Dr Ignacio Mccallum, [facility].    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 No murmurs. Regular rate and rhythm. Abdomen: Distended abdomen. Diffuse tenderness. Extremities: No clubbing/cyanosis/edema. Skin: No rashes, no pallor  Neuro: Awake oriented ×3.   Nonfocal.  Good strength All other components within normal limits   CBC W/ DIFFERENTIAL - Abnormal; Notable for the following components:    WBC 16.2 (*)     RBC 3.18 (*)     HGB 9.6 (*)     HCT 29.5 (*)     Neutrophil Absolute Prelim 13.03 (*)     Neutrophil Absolute 13.03 (*) Screen[[id number]]                                  Final result                 Please view results for these tests on the individual orders.    PREPARE RBC   ABORH (BLOOD TYPE)   ANTIBODY SCREEN   PREPARE RBC   SURGICAL PATHOLOGY TISSUE   RAINBOW DRAW BLUE H&P - H&P Note      H&P signed by Pelon Beal DO at 9/25/2021 12:30 AM     Author: Pelon Beal DO Service: Hospitalist Author Type: Physician    Filed: 9/25/2021 12:30 AM Date of Service: 9/24/2021 10:25 PM Status: Signed    : Chucky Van heart surgery in 2014 she had issues with hypotension and was briefly on midodrine. No chest pain or pressure.     Past Medical History:  Past Medical History:   Diagnosis Date   • ANEMIA     only post op given iron supplements for 2 mos   • Collapsed lung drink alcohol and does not use drugs.     Family History:   Family History   Problem Relation Age of Onset   • Diabetes Father    • Hypertension Father    • Diabetes Mother    • Hypertension Mother    • No Known Problems Maternal Grandmother    • Diabetes M Respiratory: Clear to auscultation bilaterally. No wheezes. No rhonchi. Cardiovascular: S1, S2. Regular rate and rhythm. No murmurs, rubs or gallops. Equal pulses. Chest and Back: No tenderness or deformity. Abdomen: Soft, nontender, nondistended.   P following  2. Hemorrhagic shock, Acute blood loss Anemia   1. Hemoglobin is stable post transfusion (2 U PRBC in PACU), monitor and transfuse additional PRBCs for Hb < 7  2. Arterial line in place, wean Norepinephrine as able to maintain MAP  >65  3.  Hypot carbonate (OSCAL) tab 500 mg     Date Action Dose Route User    9/27/2021 0825 Given  Oral Nagi Ledbetter RN    9/26/2021 1630 Given  Oral Barbara Chaudhry RN      PEG 3350 [facility]) powder packet 17 g     Date Action Dose Route User    9/26/2021 1116 Given  Or Consultation:   hypovolemic shock, hemoperitoneum, probable ruptured ectopic     History of Present Illness:  Sussy Christianson is a a(n) 46year old female. , methotrexate 3 days ago. Pelvic pain since am and then passed out. US 9/21 left adnexal mass.  No IUP hemodynamically unstable     Postop diagnosis:           Same  Procedure:                      Exploratory laparotomy, evacuation of hemoperitoneum, left salpingectomy     Surgeon:                         Lloyd Villarreal  Assistant:                        Moishe Nageotte 100/74  AO: ()/(46-72) 102/72     • levothyroxine  50 mcg Oral Before breakfast   • Fluticasone Propionate  2 spray Each Nare Nightly   • docusate sodium  100 mg Oral BID         Exam:  Gen A&O, NAD, pale   CV RRR  Lungs CTAB  Abd soft, mild to moder Range     Glucose 137 (H) 70 - 99 mg/dL     Sodium 138 136 - 145 mmol/L     Potassium 3.8 3.5 - 5.1 mmol/L     Chloride 109 98 - 112 mmol/L     CO2 26.0 21.0 - 32.0 mmol/L     Anion Gap 3 0 - 18 mmol/L     BUN 9 7 - 18 mg/dL     Creatinine 0.90 0.55 - 1.02 Range     Antibody Screen Negative     Rapid SARS-CoV-2 by PCR     Collection Time: 09/24/21  2:32 PM     Specimen: Nares;  Other   Result Value Ref Range     Rapid SARS-CoV-2 by PCR Not Detected Not Detected   JOANNE DRAW BLUE     Collection Time: 09/24/2 >=60     GFR, -American 131 >=60     AST 16 15 - 37 U/L     ALT 28 13 - 56 U/L     Alkaline Phosphatase 44 37 - 98 U/L     Bilirubin, Total 0.3 0.1 - 2.0 mg/dL     Total Protein 5.4 (L) 6.4 - 8.2 g/dL     Albumin 2.8 (L) 3.4 - 5.0 g/dL     Globulin Patient Temperature 98.5 F     Total Hemoglobin 9.5 (L) 11.7 - 15.3 g/dL     Carboxyhemoglobin 1.1 0.0 - 3.0 % SAT     Methemoglobin 0.7 0.4 - 1.5 % SAT     Allens Test Not Applicable       Sample Site Arterial Line       ABG Device Room Air     Hemoglobin   Monocyte % 4.6 %     Eosinophil % 0.0 %     Basophil % 0.1 %     Immature Granulocyte % 0.3 %                  ASSESSMENT/PLAN:     1. hypotension due to acute blood loss anemia - resolved   2. Acute blood loss anemia due to ruptured ectopic.   No evide Hypovolemic shock ([address]    Hemoperitoneum due to rupture of left tubal ectopic pregnancy    Status post exploratory laparotomy    History of methotrexate therapy    History of transfusion    History of congenital heart disease-scimitar syndrome status post Reviewed she may still need another transfusion if she is feeling very poorly  -IV iron daily has been ordered. Received a dose today.      Pain controlled  -[place]      Advance diet as tolerated     SCDs, ambulation encouraged  Disposition: inpatient.  Poss < > = values in this interval not displayed.         Chemisty:          Recent Labs   Lab 09/21/21  1032 09/24/21  1422 09/24/21 1954 09/25/21  0518 09/26/21  0520    138 137 139 140   K 4.5 3.8 3.8 4.0 3.4*    109 113* 111 109   CO2 30.0 2

## 2021-09-27 NOTE — PLAN OF CARE
Problem: Patient/Family Goals  Goal: Patient/Family Long Term Goal  Description: Patient's Long Term Goal: leave hospital    Interventions:  -   - See additional Care Plan goals for specific interventions  Outcome: Adequate for Discharge  Goal: Patient/F stability  Description: INTERVENTIONS:  - Monitor vital signs, rhythm, and trends  - Monitor for bleeding, hypotension and signs of decreased cardiac output  - Evaluate effectiveness of vasoactive medications to optimize hemodynamic stability  - Monitor ar symptoms of bleeding or hemorrhage  - Monitor labs and vital signs for trends  - Administer supportive blood products/factors, fluids and medications as ordered and appropriate  - Administer supportive blood products/factors as ordered and appropriate  Out

## 2021-09-27 NOTE — DISCHARGE SUMMARY
[facility]  Discharge Summary    Marchia Carrel Patient Status:  Inpatient    1980 MRN [id number]   [facility] 3NW-A Attending Aamir uH MD   [address] PCP Michaela Keane MD     Date of Admission: 2021    Date of Discharge echocardiogram showed LVEF 60-65% she received 2 units of packed RBCs for 700 mL loss in hemoperitoneum. She required transfer to ICU because of severe hypotension after laparotomy and left salpingectomy for ruptured ectopic pregnancy.   2 doses of intrave daily. Levothyroxine Sodium 50 MCG Oral Tab  Take 50 mcg by mouth every morning. Take on an empty stomach    aspirin 81 MG Oral Tab  Take 81 mg by mouth daily. Follow up Visits: Follow-up with obstetrician     Follow up Labs: None ordered .     Other

## 2021-09-28 NOTE — PAYOR COMM NOTE
--------------  DISCHARGE REVIEW    Payor: Audelia Cleary #:  [id number]  Authorization Number: [id number]    Admit date: 9/24/21  Admit time:   3:01 PM  Discharge Date: 9/27/2021  3:39 PM     Admitting Physician: Felicitas Najjar, MD  Attending Ph

## 2021-10-13 ENCOUNTER — TELEPHONE (OUTPATIENT)
Dept: OBGYN CLINIC | Facility: CLINIC | Age: 41
End: 2021-10-13

## 2021-10-13 ENCOUNTER — TELEPHONE (OUTPATIENT)
Dept: OBGYN UNIT | Facility: HOSPITAL | Age: 41
End: 2021-10-13

## 2021-10-13 ENCOUNTER — OFFICE VISIT (OUTPATIENT)
Dept: OBGYN CLINIC | Facility: CLINIC | Age: 41
End: 2021-10-13
Payer: COMMERCIAL

## 2021-10-13 VITALS
DIASTOLIC BLOOD PRESSURE: 60 MMHG | WEIGHT: 159 LBS | SYSTOLIC BLOOD PRESSURE: 100 MMHG | HEIGHT: 63 IN | HEART RATE: 53 BPM | BODY MASS INDEX: 28.17 KG/M2

## 2021-10-13 DIAGNOSIS — O00.102 RUPTURED LEFT TUBAL ECTOPIC PREGNANCY CAUSING HEMOPERITONEUM: ICD-10-CM

## 2021-10-13 DIAGNOSIS — Z98.890 STATUS POST EXPLORATORY LAPAROTOMY: Primary | ICD-10-CM

## 2021-10-13 DIAGNOSIS — K66.1 RUPTURED LEFT TUBAL ECTOPIC PREGNANCY CAUSING HEMOPERITONEUM: ICD-10-CM

## 2021-10-13 DIAGNOSIS — D62 ACUTE BLOOD LOSS ANEMIA: ICD-10-CM

## 2021-10-13 PROCEDURE — 3008F BODY MASS INDEX DOCD: CPT | Performed by: STUDENT IN AN ORGANIZED HEALTH CARE EDUCATION/TRAINING PROGRAM

## 2021-10-13 PROCEDURE — 99024 POSTOP FOLLOW-UP VISIT: CPT | Performed by: STUDENT IN AN ORGANIZED HEALTH CARE EDUCATION/TRAINING PROGRAM

## 2021-10-13 PROCEDURE — 3078F DIAST BP <80 MM HG: CPT | Performed by: STUDENT IN AN ORGANIZED HEALTH CARE EDUCATION/TRAINING PROGRAM

## 2021-10-13 PROCEDURE — 3074F SYST BP LT 130 MM HG: CPT | Performed by: STUDENT IN AN ORGANIZED HEALTH CARE EDUCATION/TRAINING PROGRAM

## 2021-10-13 NOTE — PROGRESS NOTES
[address] [facility]  Obstetrics and Gynecology   Postoperative Progress Note    CC: Patient presents with:  Post-Op: exploratory laprascopy left salpingectomy on 9/24/21, if she takes a deep breathe, her left side hurts, says it's like a radiating pain 6 weeks off to recover from surgery.  Says she will call us if does not feel she is ready to return to work at the 4 week postoperative time point    RTC in 4 weeks (for 6 week postop visit)    Chang Deshpande MD

## 2021-10-13 NOTE — TELEPHONE ENCOUNTER
Fmla has been completed & signed. faxed to [facility]. Disability Claims @ [phone].

## 2021-10-18 ENCOUNTER — TELEPHONE (OUTPATIENT)
Dept: OBGYN CLINIC | Facility: CLINIC | Age: 41
End: 2021-10-18

## 2021-10-18 NOTE — TELEPHONE ENCOUNTER
Pt states she needs an appointment before Thursday in order for her insurance to approve her extension of STD. Pt was seen already for a 2wk post op and is scheduled for a 6wk follow up.   Pt requesting appointment with Dr. Pacheco Adjutant, advised pt she is out

## 2021-10-19 NOTE — PROGRESS NOTES
Pt to come to OV today. [employer] message sent. Rafael Michael,   It looks like your anemia has resolved. It was likely due to recent blood loss. We will plan to repeat blood work with your November post-op appointment.    Thanks,   [email or web address]

## 2021-10-20 ENCOUNTER — TELEPHONE (OUTPATIENT)
Dept: OBGYN CLINIC | Facility: CLINIC | Age: 41
End: 2021-10-20

## 2021-10-20 NOTE — TELEPHONE ENCOUNTER
Pt called to cancel her appointment. Pt states her  had surgery today and is at home but not up and moving yet. Pt states she will not be able to make it in and she does not need to reschedule.

## 2021-10-21 NOTE — PROGRESS NOTES
Spoke with pt. Asked if she had questions regarding her results. She did not have any questions. She needs a return to work for tomorrow. Note sent through [address] [place] [address] [address]. Pt aware. Verbalized understanding.

## 2021-11-08 ENCOUNTER — HOSPITAL ENCOUNTER (OUTPATIENT)
Age: 41
Discharge: HOME | End: 2021-11-08
Payer: COMMERCIAL

## 2021-11-08 VITALS
TEMPERATURE: 100 F | RESPIRATION RATE: 16 BRPM | HEART RATE: 100 BPM | OXYGEN SATURATION: 98 % | SYSTOLIC BLOOD PRESSURE: 108 MMHG | DIASTOLIC BLOOD PRESSURE: 69 MMHG | BODY MASS INDEX: 28.35 KG/M2 | WEIGHT: 160 LBS | HEIGHT: 63 IN

## 2021-11-08 DIAGNOSIS — K52.9 GASTROENTERITIS: Primary | ICD-10-CM

## 2021-11-08 PROCEDURE — 81002 URINALYSIS NONAUTO W/O SCOPE: CPT | Performed by: PHYSICIAN ASSISTANT

## 2021-11-08 PROCEDURE — 99203 OFFICE O/P NEW LOW 30 MIN: CPT | Performed by: PHYSICIAN ASSISTANT

## 2021-11-08 RX ORDER — ONDANSETRON 4 MG/1
4 TABLET, ORALLY DISINTEGRATING ORAL ONCE
Status: COMPLETED | OUTPATIENT
Start: 2021-11-08 | End: 2021-11-08

## 2021-11-08 RX ORDER — ONDANSETRON 4 MG/1
4 TABLET, ORALLY DISINTEGRATING ORAL EVERY 4 HOURS PRN
Qty: 10 TABLET | Refills: 0 | Status: SHIPPED | OUTPATIENT
Start: 2021-11-08 | End: 2021-11-15

## 2021-11-09 NOTE — ED INITIAL ASSESSMENT (HPI)
Patient reports nausea as well as lower abdominal pain and feeling chills and feverish since this morning. No appetite, fatigue, 2 episodes of diarrhea. Neg covid test at home. Denies any urinary concerns. abd pain is cramping in nature.

## 2021-11-09 NOTE — ED PROVIDER NOTES
Patient Seen in: [facility]mediate [address]      History   Patient presents with:  Abdomen/Flank Pain    Stated Complaint: diarrhea, abd pain, tired     Subjective:   HPI    44yo female presents to IC for evaluation for diarrhea, abd cramps and naus 2014   • OPEN HEART SURGICAL PROFILE      Open heart surgery for Scimitar Syndrome (congenital venous anomaly) at [facility] HISTORY  09/24/2021    exploratory laprascopy left salpingectomy, 8wks   • SALPINGECTOMY Left 09/23/2021    Ex- less than 2 seconds. Neurological:      Mental Status: She is alert and oriented to person, place, and time.    Psychiatric:         Mood and Affect: Mood normal.             ED Course     Labs Reviewed   POCT URINALYSIS DIPSTICK - Normal

## 2021-11-12 ENCOUNTER — OFFICE VISIT (OUTPATIENT)
Dept: OBGYN CLINIC | Facility: CLINIC | Age: 41
End: 2021-11-12
Payer: COMMERCIAL

## 2021-11-12 VITALS
HEART RATE: 80 BPM | BODY MASS INDEX: 28.49 KG/M2 | HEIGHT: 63 IN | SYSTOLIC BLOOD PRESSURE: 100 MMHG | WEIGHT: 160.81 LBS | DIASTOLIC BLOOD PRESSURE: 60 MMHG

## 2021-11-12 DIAGNOSIS — D75.839 THROMBOCYTOSIS: ICD-10-CM

## 2021-11-12 DIAGNOSIS — Z98.890 STATUS POST EXPLORATORY LAPAROTOMY: Primary | ICD-10-CM

## 2021-11-12 PROCEDURE — 3074F SYST BP LT 130 MM HG: CPT | Performed by: STUDENT IN AN ORGANIZED HEALTH CARE EDUCATION/TRAINING PROGRAM

## 2021-11-12 PROCEDURE — 3078F DIAST BP <80 MM HG: CPT | Performed by: STUDENT IN AN ORGANIZED HEALTH CARE EDUCATION/TRAINING PROGRAM

## 2021-11-12 PROCEDURE — 3008F BODY MASS INDEX DOCD: CPT | Performed by: STUDENT IN AN ORGANIZED HEALTH CARE EDUCATION/TRAINING PROGRAM

## 2021-11-12 PROCEDURE — 99024 POSTOP FOLLOW-UP VISIT: CPT | Performed by: STUDENT IN AN ORGANIZED HEALTH CARE EDUCATION/TRAINING PROGRAM

## 2021-11-12 NOTE — PROGRESS NOTES
[facility]  Obstetrics and Gynecology   Postpartum Progress Note    CC: Patient presents with:  Post-Op: EXPLORATORY LAPAROTOMY LEFT SALPINGECTOMY        Subjective:     Joel Vaughn is a 46year old  female who is s/p exploratory laparoto

## 2021-12-13 ENCOUNTER — TELEPHONE (OUTPATIENT)
Dept: OBGYN CLINIC | Facility: CLINIC | Age: 41
End: 2021-12-13

## 2022-04-15 LAB
ABSOLUTE BASOPHILS: 46 CELLS/UL (ref 0–200)
ABSOLUTE EOSINOPHILS: 162 CELLS/UL (ref 15–500)
ABSOLUTE LYMPHOCYTES: 2433 CELLS/UL (ref 850–3900)
ABSOLUTE MONOCYTES: 639 CELLS/UL (ref 200–950)
ABSOLUTE NEUTROPHILS: 4420 CELLS/UL (ref 1500–7800)
BASOPHILS: 0.6 %
EOSINOPHILS: 2.1 %
HEMATOCRIT: 39.4 % (ref 35–45)
HEMOGLOBIN: 13.2 G/DL (ref 11.7–15.5)
LYMPHOCYTES: 31.6 %
MCH: 29.9 PG (ref 27–33)
MCHC: 33.5 G/DL (ref 32–36)
MCV: 89.3 FL (ref 80–100)
MONOCYTES: 8.3 %
MPV: 8.9 FL (ref 7.5–12.5)
NEUTROPHILS: 57.4 %
PLATELET COUNT: 532 THOUSAND/UL (ref 140–400)
RDW: 11.9 % (ref 11–15)
RED BLOOD CELL COUNT: 4.41 MILLION/UL (ref 3.8–5.1)
WHITE BLOOD CELL COUNT: 7.7 THOUSAND/UL (ref 3.8–10.8)

## 2022-04-19 ENCOUNTER — TELEPHONE (OUTPATIENT)
Dept: HEMATOLOGY/ONCOLOGY | Facility: HOSPITAL | Age: 42
End: 2022-04-19

## 2022-04-19 NOTE — TELEPHONE ENCOUNTER
Patient calling to schedule consult with Hematology.     She is requesting 1st available     Referring Provider: Windy Davis   Diagnosis: D75.839 (ICD-10-CM) - 238.71 (ICD-9-CM) - Thrombocytosis   Thrombocytosis  -     CBC WITH DIFFERENTIAL WITH PLATELET  55/65/9674  notes and labs in [facility]

## 2022-04-25 ENCOUNTER — OFFICE VISIT (OUTPATIENT)
Dept: HEMATOLOGY/ONCOLOGY | Age: 42
End: 2022-04-25
Attending: INTERNAL MEDICINE
Payer: COMMERCIAL

## 2022-04-25 VITALS
WEIGHT: 163 LBS | OXYGEN SATURATION: 98 % | TEMPERATURE: 98 F | HEIGHT: 62.99 IN | BODY MASS INDEX: 28.88 KG/M2 | HEART RATE: 91 BPM | DIASTOLIC BLOOD PRESSURE: 75 MMHG | SYSTOLIC BLOOD PRESSURE: 111 MMHG

## 2022-04-25 DIAGNOSIS — D64.9 ANEMIA, UNSPECIFIED TYPE: Primary | ICD-10-CM

## 2022-04-25 DIAGNOSIS — D75.839 THROMBOCYTOSIS: ICD-10-CM

## 2022-04-25 LAB
ALBUMIN SERPL-MCNC: 3.8 G/DL (ref 3.4–5)
ALBUMIN/GLOB SERPL: 1 {RATIO} (ref 1–2)
ALP LIVER SERPL-CCNC: 77 U/L
ALT SERPL-CCNC: 31 U/L
ANION GAP SERPL CALC-SCNC: 6 MMOL/L (ref 0–18)
AST SERPL-CCNC: 21 U/L (ref 15–37)
BASOPHILS # BLD AUTO: 0.06 X10(3) UL (ref 0–0.2)
BASOPHILS NFR BLD AUTO: 0.6 %
BILIRUB SERPL-MCNC: 0.2 MG/DL (ref 0.1–2)
BUN BLD-MCNC: 10 MG/DL (ref 7–18)
CALCIUM BLD-MCNC: 9.6 MG/DL (ref 8.5–10.1)
CHLORIDE SERPL-SCNC: 105 MMOL/L (ref 98–112)
CO2 SERPL-SCNC: 26 MMOL/L (ref 21–32)
CREAT BLD-MCNC: 0.89 MG/DL
CRP SERPL-MCNC: 0.58 MG/DL (ref ?–0.3)
DEPRECATED HBV CORE AB SER IA-ACNC: 49.9 NG/ML
EOSINOPHIL # BLD AUTO: 0.13 X10(3) UL (ref 0–0.7)
EOSINOPHIL NFR BLD AUTO: 1.3 %
ERYTHROCYTE [DISTWIDTH] IN BLOOD BY AUTOMATED COUNT: 12.5 %
ERYTHROCYTE [SEDIMENTATION RATE] IN BLOOD: 15 MM/HR
FASTING STATUS PATIENT QL REPORTED: NO
GLOBULIN PLAS-MCNC: 3.9 G/DL (ref 2.8–4.4)
GLUCOSE BLD-MCNC: 96 MG/DL (ref 70–99)
HCT VFR BLD AUTO: 39.2 %
HGB BLD-MCNC: 13.2 G/DL
IMM GRANULOCYTES # BLD AUTO: 0.02 X10(3) UL (ref 0–1)
IMM GRANULOCYTES NFR BLD: 0.2 %
IRON SATN MFR SERPL: 11 %
IRON SERPL-MCNC: 40 UG/DL
LYMPHOCYTES # BLD AUTO: 2.1 X10(3) UL (ref 1–4)
LYMPHOCYTES NFR BLD AUTO: 21.1 %
MCH RBC QN AUTO: 29.9 PG (ref 26–34)
MCHC RBC AUTO-ENTMCNC: 33.7 G/DL (ref 31–37)
MCV RBC AUTO: 88.7 FL
MONOCYTES # BLD AUTO: 0.71 X10(3) UL (ref 0.1–1)
MONOCYTES NFR BLD AUTO: 7.1 %
NEUTROPHILS # BLD AUTO: 6.93 X10 (3) UL (ref 1.5–7.7)
NEUTROPHILS # BLD AUTO: 6.93 X10(3) UL (ref 1.5–7.7)
NEUTROPHILS NFR BLD AUTO: 69.7 %
OSMOLALITY SERPL CALC.SUM OF ELEC: 283 MOSM/KG (ref 275–295)
PLATELET # BLD AUTO: 500 10(3)UL (ref 150–450)
POTASSIUM SERPL-SCNC: 4.1 MMOL/L (ref 3.5–5.1)
PROT SERPL-MCNC: 7.7 G/DL (ref 6.4–8.2)
RBC # BLD AUTO: 4.42 X10(6)UL
SODIUM SERPL-SCNC: 137 MMOL/L (ref 136–145)
TIBC SERPL-MCNC: 370 UG/DL (ref 240–450)
TRANSFERRIN SERPL-MCNC: 248 MG/DL (ref 200–360)
WBC # BLD AUTO: 10 X10(3) UL (ref 4–11)

## 2022-04-25 PROCEDURE — 99205 OFFICE O/P NEW HI 60 MIN: CPT | Performed by: INTERNAL MEDICINE

## 2022-04-25 NOTE — PROGRESS NOTES
Education Record    Learner:  Patient    Disease / Diagnosis:thrombocytosis     Barriers / Limitations:  None   Comments:    Method:  Discussion   Comments:    General Topics:  Plan of care reviewed   Comments:    Outcome:  Shows understanding   Comments:    Patient here as a new consult. States energy levels are fair. denies any signs of active bleeding or heavy menses. Denies any lightheadedness or dyspnea.  Has had IV iron in the past.

## 2022-05-03 ENCOUNTER — APPOINTMENT (OUTPATIENT)
Dept: HEMATOLOGY/ONCOLOGY | Facility: HOSPITAL | Age: 42
End: 2022-05-03
Attending: INTERNAL MEDICINE
Payer: COMMERCIAL

## 2022-05-03 ENCOUNTER — TELEPHONE (OUTPATIENT)
Dept: HEMATOLOGY/ONCOLOGY | Facility: HOSPITAL | Age: 42
End: 2022-05-03

## 2022-05-09 ENCOUNTER — TELEPHONE (OUTPATIENT)
Dept: HEMATOLOGY/ONCOLOGY | Age: 42
End: 2022-05-09

## 2022-05-09 LAB — JAK2 GENE, V617F MUTATION, QUALITATIVE: NOT DETECTED

## 2022-05-10 NOTE — TELEPHONE ENCOUNTER
Spoke with patient. She verbalized understanding. The JAK2 is negative. There is another test pending but I doubt it would be positive.

## 2022-05-12 LAB — CALR EXON 9 MUTATION ANALYSIS - RESULT: NOT DETECTED

## 2022-05-19 LAB — MPL CODON 515 RESULTS: NOT DETECTED

## 2022-07-07 LAB — CYTOLOGY CVX/VAG DOC THIN PREP: NORMAL

## 2022-08-26 ENCOUNTER — HOSPITAL ENCOUNTER (OUTPATIENT)
Dept: MAMMOGRAPHY | Facility: HOSPITAL | Age: 42
Discharge: HOME | End: 2022-08-26
Attending: INTERNAL MEDICINE
Payer: COMMERCIAL

## 2022-08-26 DIAGNOSIS — Z12.31 ENCOUNTER FOR SCREENING MAMMOGRAM FOR MALIGNANT NEOPLASM OF BREAST: ICD-10-CM

## 2022-09-07 ENCOUNTER — HOSPITAL ENCOUNTER (OUTPATIENT)
Dept: MAMMOGRAPHY | Facility: HOSPITAL | Age: 42
Discharge: HOME | End: 2022-09-07
Attending: STUDENT IN AN ORGANIZED HEALTH CARE EDUCATION/TRAINING PROGRAM
Payer: COMMERCIAL

## 2022-09-07 DIAGNOSIS — Z12.31 SCREENING MAMMOGRAM FOR BREAST CANCER: ICD-10-CM

## 2022-09-07 PROCEDURE — 77067 SCR MAMMO BI INCL CAD: CPT | Performed by: STUDENT IN AN ORGANIZED HEALTH CARE EDUCATION/TRAINING PROGRAM

## 2022-09-07 PROCEDURE — 77063 BREAST TOMOSYNTHESIS BI: CPT | Performed by: STUDENT IN AN ORGANIZED HEALTH CARE EDUCATION/TRAINING PROGRAM

## 2023-02-09 ENCOUNTER — TELEPHONE (OUTPATIENT)
Dept: FAMILY MEDICINE | Age: 43
End: 2023-02-09

## 2023-02-09 ENCOUNTER — OFFICE VISIT (OUTPATIENT)
Dept: INTERNAL MEDICINE | Age: 43
End: 2023-02-09

## 2023-02-09 VITALS
WEIGHT: 167 LBS | OXYGEN SATURATION: 98 % | HEART RATE: 74 BPM | SYSTOLIC BLOOD PRESSURE: 114 MMHG | TEMPERATURE: 98.4 F | BODY MASS INDEX: 29.59 KG/M2 | RESPIRATION RATE: 16 BRPM | DIASTOLIC BLOOD PRESSURE: 79 MMHG | HEIGHT: 63 IN

## 2023-02-09 DIAGNOSIS — J30.9 ALLERGIC RHINITIS, UNSPECIFIED SEASONALITY, UNSPECIFIED TRIGGER: ICD-10-CM

## 2023-02-09 DIAGNOSIS — R63.5 WEIGHT GAIN: ICD-10-CM

## 2023-02-09 DIAGNOSIS — K21.9 GASTROESOPHAGEAL REFLUX DISEASE, UNSPECIFIED WHETHER ESOPHAGITIS PRESENT: ICD-10-CM

## 2023-02-09 DIAGNOSIS — D75.839 THROMBOCYTOSIS: ICD-10-CM

## 2023-02-09 DIAGNOSIS — R73.9 HYPERGLYCEMIA: ICD-10-CM

## 2023-02-09 DIAGNOSIS — E03.9 HYPOTHYROIDISM (ACQUIRED): Primary | ICD-10-CM

## 2023-02-09 PROCEDURE — 99204 OFFICE O/P NEW MOD 45 MIN: CPT | Performed by: INTERNAL MEDICINE

## 2023-02-09 RX ORDER — ASCORBIC ACID, CHOLECALCIFEROL, .ALPHA.-TOCOPHEROL ACETATE, DL-, PYRIDOXINE, FOLIC ACID, CYANOCOBALAMIN, CALCIUM, FERROUS FUMARATE, MAGNESIUM, DOCONEXENT 85; 200; 10; 25; 1; 12; 140; 27; 45; 300 [IU]/1; [IU]/1; [IU]/1; [IU]/1; MG/1; UG/1; MG/1; MG/1; MG/1; MG/1
1 CAPSULE, GELATIN COATED ORAL DAILY
COMMUNITY
Start: 2022-12-19 | End: 2023-02-09

## 2023-02-09 RX ORDER — FLUTICASONE PROPIONATE 50 MCG
SPRAY, SUSPENSION (ML) NASAL
COMMUNITY
Start: 2023-02-07 | End: 2023-02-09 | Stop reason: SDUPTHER

## 2023-02-09 RX ORDER — LEVOTHYROXINE SODIUM 0.05 MG/1
TABLET ORAL
COMMUNITY
Start: 2022-12-30 | End: 2023-02-19 | Stop reason: SDUPTHER

## 2023-02-09 RX ORDER — FLUTICASONE PROPIONATE 50 MCG
2 SPRAY, SUSPENSION (ML) NASAL DAILY
Qty: 48 G | Refills: 1 | Status: SHIPPED | OUTPATIENT
Start: 2023-02-09 | End: 2023-06-12

## 2023-02-09 RX ORDER — PANTOPRAZOLE SODIUM 40 MG/1
40 TABLET, DELAYED RELEASE ORAL DAILY
Qty: 30 TABLET | Refills: 2 | Status: SHIPPED | OUTPATIENT
Start: 2023-02-09 | End: 2023-04-05 | Stop reason: SDUPTHER

## 2023-02-09 ASSESSMENT — ENCOUNTER SYMPTOMS
DIARRHEA: 0
LIGHT-HEADEDNESS: 0
WHEEZING: 0
FEVER: 0
TROUBLE SWALLOWING: 0
EYE PAIN: 0
BACK PAIN: 0
COLOR CHANGE: 0
SINUS PRESSURE: 0
NAUSEA: 0
EYE REDNESS: 0
DIZZINESS: 0
ABDOMINAL DISTENTION: 0
BLOOD IN STOOL: 0
UNEXPECTED WEIGHT CHANGE: 0
APPETITE CHANGE: 0
CONSTIPATION: 0
COUGH: 0
NUMBNESS: 0
CHEST TIGHTNESS: 0
WEAKNESS: 0
SHORTNESS OF BREATH: 0
ABDOMINAL PAIN: 0
FATIGUE: 0
NERVOUS/ANXIOUS: 0
ROS GI COMMENTS: GERD
SORE THROAT: 0
EYE DISCHARGE: 0
HEADACHES: 0

## 2023-02-09 ASSESSMENT — PATIENT HEALTH QUESTIONNAIRE - PHQ9
CLINICAL INTERPRETATION OF PHQ2 SCORE: NO FURTHER SCREENING NEEDED
2. FEELING DOWN, DEPRESSED OR HOPELESS: NOT AT ALL
SUM OF ALL RESPONSES TO PHQ9 QUESTIONS 1 AND 2: 0
SUM OF ALL RESPONSES TO PHQ9 QUESTIONS 1 AND 2: 0
1. LITTLE INTEREST OR PLEASURE IN DOING THINGS: NOT AT ALL

## 2023-02-16 ENCOUNTER — CLINICAL DOCUMENTATION (OUTPATIENT)
Dept: INTERNAL MEDICINE | Age: 43
End: 2023-02-16

## 2023-02-16 ENCOUNTER — HOSPITAL ENCOUNTER (OUTPATIENT)
Dept: ULTRASOUND IMAGING | Age: 43
Discharge: HOME | End: 2023-02-16
Attending: INTERNAL MEDICINE
Payer: COMMERCIAL

## 2023-02-16 DIAGNOSIS — E03.9 ACQUIRED HYPOTHYROIDISM: ICD-10-CM

## 2023-02-16 PROCEDURE — 76536 US EXAM OF HEAD AND NECK: CPT | Performed by: INTERNAL MEDICINE

## 2023-02-17 ENCOUNTER — LAB SERVICES (OUTPATIENT)
Dept: LAB | Age: 43
End: 2023-02-17

## 2023-02-17 DIAGNOSIS — R63.5 WEIGHT GAIN: ICD-10-CM

## 2023-02-17 DIAGNOSIS — E03.9 HYPOTHYROIDISM (ACQUIRED): ICD-10-CM

## 2023-02-17 DIAGNOSIS — R73.9 HYPERGLYCEMIA: ICD-10-CM

## 2023-02-17 DIAGNOSIS — K21.9 GASTROESOPHAGEAL REFLUX DISEASE, UNSPECIFIED WHETHER ESOPHAGITIS PRESENT: ICD-10-CM

## 2023-02-17 LAB
ALBUMIN SERPL-MCNC: 3.8 G/DL (ref 3.6–5.1)
ALBUMIN/GLOB SERPL: 1.2 {RATIO} (ref 1–2.4)
ALP SERPL-CCNC: 71 UNITS/L (ref 45–117)
ALT SERPL-CCNC: 50 UNITS/L
ANION GAP SERPL CALC-SCNC: 6 MMOL/L (ref 7–19)
AST SERPL-CCNC: 29 UNITS/L
BASOPHILS # BLD: 0.1 K/MCL (ref 0–0.3)
BASOPHILS NFR BLD: 1 %
BILIRUB SERPL-MCNC: 0.4 MG/DL (ref 0.2–1)
BUN SERPL-MCNC: 10 MG/DL (ref 6–20)
BUN/CREAT SERPL: 12 (ref 7–25)
CALCIUM SERPL-MCNC: 9.2 MG/DL (ref 8.4–10.2)
CHLORIDE SERPL-SCNC: 105 MMOL/L (ref 97–110)
CHOLEST SERPL-MCNC: 188 MG/DL
CHOLEST/HDLC SERPL: 4.5 {RATIO}
CO2 SERPL-SCNC: 28 MMOL/L (ref 21–32)
CREAT SERPL-MCNC: 0.83 MG/DL (ref 0.51–0.95)
DEPRECATED RDW RBC: 45.8 FL (ref 39–50)
EOSINOPHIL # BLD: 0.3 K/MCL (ref 0–0.5)
EOSINOPHIL NFR BLD: 3 %
ERYTHROCYTE [DISTWIDTH] IN BLOOD: 13.8 % (ref 11–15)
FASTING DURATION TIME PATIENT: 12 HOURS (ref 0–999)
FASTING DURATION TIME PATIENT: 12 HOURS (ref 0–999)
GFR SERPLBLD BASED ON 1.73 SQ M-ARVRAT: >90 ML/MIN
GLOBULIN SER-MCNC: 3.3 G/DL (ref 2–4)
GLUCOSE SERPL-MCNC: 86 MG/DL (ref 70–99)
HBA1C MFR BLD: 5.3 % (ref 4.5–5.6)
HCT VFR BLD CALC: 38.9 % (ref 36–46.5)
HDLC SERPL-MCNC: 42 MG/DL
HGB BLD-MCNC: 12.9 G/DL (ref 12–15.5)
IMM GRANULOCYTES # BLD AUTO: 0 K/MCL (ref 0–0.2)
IMM GRANULOCYTES # BLD: 0 %
LDLC SERPL CALC-MCNC: 118 MG/DL
LYMPHOCYTES # BLD: 2.8 K/MCL (ref 1–4.8)
LYMPHOCYTES NFR BLD: 30 %
MCH RBC QN AUTO: 30.4 PG (ref 26–34)
MCHC RBC AUTO-ENTMCNC: 33.2 G/DL (ref 32–36.5)
MCV RBC AUTO: 91.7 FL (ref 78–100)
MONOCYTES # BLD: 0.7 K/MCL (ref 0.3–0.9)
MONOCYTES NFR BLD: 7 %
NEUTROPHILS # BLD: 5.6 K/MCL (ref 1.8–7.7)
NEUTROPHILS NFR BLD: 59 %
NONHDLC SERPL-MCNC: 146 MG/DL
NRBC BLD MANUAL-RTO: 0 /100 WBC
PLATELET # BLD AUTO: 492 K/MCL (ref 140–450)
POTASSIUM SERPL-SCNC: 4.2 MMOL/L (ref 3.4–5.1)
PROT SERPL-MCNC: 7.1 G/DL (ref 6.4–8.2)
RBC # BLD: 4.24 MIL/MCL (ref 4–5.2)
SODIUM SERPL-SCNC: 135 MMOL/L (ref 135–145)
TRIGL SERPL-MCNC: 142 MG/DL
TSH SERPL-ACNC: 2.87 MCUNITS/ML (ref 0.35–5)
WBC # BLD: 9.5 K/MCL (ref 4.2–11)

## 2023-02-17 PROCEDURE — 80050 GENERAL HEALTH PANEL: CPT | Performed by: INTERNAL MEDICINE

## 2023-02-17 PROCEDURE — 83036 HEMOGLOBIN GLYCOSYLATED A1C: CPT | Performed by: INTERNAL MEDICINE

## 2023-02-17 PROCEDURE — 80061 LIPID PANEL: CPT | Performed by: INTERNAL MEDICINE

## 2023-02-17 PROCEDURE — 36415 COLL VENOUS BLD VENIPUNCTURE: CPT | Performed by: INTERNAL MEDICINE

## 2023-02-19 PROBLEM — D75.839 THROMBOCYTOSIS: Status: ACTIVE | Noted: 2023-02-19

## 2023-02-19 RX ORDER — LEVOTHYROXINE SODIUM 0.05 MG/1
50 TABLET ORAL DAILY
Qty: 90 TABLET | Refills: 3 | Status: SHIPPED | OUTPATIENT
Start: 2023-02-19

## 2023-02-20 DIAGNOSIS — E03.9 HYPOTHYROIDISM (ACQUIRED): ICD-10-CM

## 2023-02-22 ENCOUNTER — APPOINTMENT (OUTPATIENT)
Dept: ULTRASOUND IMAGING | Age: 43
End: 2023-02-22
Attending: INTERNAL MEDICINE

## 2023-03-03 ENCOUNTER — APPOINTMENT (OUTPATIENT)
Dept: ULTRASOUND IMAGING | Age: 43
End: 2023-03-03
Attending: INTERNAL MEDICINE

## 2023-03-10 ENCOUNTER — APPOINTMENT (OUTPATIENT)
Dept: ULTRASOUND IMAGING | Age: 43
End: 2023-03-10
Attending: INTERNAL MEDICINE

## 2023-04-05 ENCOUNTER — TELEPHONE (OUTPATIENT)
Dept: INTERNAL MEDICINE | Age: 43
End: 2023-04-05

## 2023-04-05 DIAGNOSIS — K21.9 GASTROESOPHAGEAL REFLUX DISEASE, UNSPECIFIED WHETHER ESOPHAGITIS PRESENT: ICD-10-CM

## 2023-04-05 RX ORDER — PANTOPRAZOLE SODIUM 40 MG/1
40 TABLET, DELAYED RELEASE ORAL DAILY
Qty: 90 TABLET | Refills: 0 | Status: SHIPPED | OUTPATIENT
Start: 2023-04-05 | End: 2023-05-10 | Stop reason: SDUPTHER

## 2023-05-03 ENCOUNTER — TELEPHONE (OUTPATIENT)
Dept: INTERNAL MEDICINE | Age: 43
End: 2023-05-03

## 2023-05-04 ENCOUNTER — V-VISIT (OUTPATIENT)
Dept: INTERNAL MEDICINE | Age: 43
End: 2023-05-04

## 2023-05-04 DIAGNOSIS — K21.9 GASTROESOPHAGEAL REFLUX DISEASE, UNSPECIFIED WHETHER ESOPHAGITIS PRESENT: Primary | ICD-10-CM

## 2023-05-04 PROCEDURE — 99214 OFFICE O/P EST MOD 30 MIN: CPT | Performed by: INTERNAL MEDICINE

## 2023-05-04 ASSESSMENT — ENCOUNTER SYMPTOMS
DIZZINESS: 0
WHEEZING: 0
LIGHT-HEADEDNESS: 0
EYE PAIN: 0
ABDOMINAL DISTENTION: 0
BLOOD IN STOOL: 0
COLOR CHANGE: 0
EYE REDNESS: 0
NAUSEA: 0
HEADACHES: 0
WEAKNESS: 0
NUMBNESS: 0
ABDOMINAL PAIN: 0
SORE THROAT: 0
DIARRHEA: 0
SINUS PRESSURE: 0
NERVOUS/ANXIOUS: 0
COUGH: 0
TROUBLE SWALLOWING: 0
BACK PAIN: 0
APPETITE CHANGE: 0
CHEST TIGHTNESS: 0
FATIGUE: 0
CONSTIPATION: 0
EYE DISCHARGE: 0
UNEXPECTED WEIGHT CHANGE: 0
SHORTNESS OF BREATH: 0
FEVER: 0

## 2023-05-08 ENCOUNTER — APPOINTMENT (OUTPATIENT)
Dept: INTERNAL MEDICINE | Age: 43
End: 2023-05-08

## 2023-05-09 ENCOUNTER — APPOINTMENT (OUTPATIENT)
Dept: INTERNAL MEDICINE | Age: 43
End: 2023-05-09

## 2023-05-09 RX ORDER — FLUOCINONIDE 0.5 MG/G
OINTMENT TOPICAL
COMMUNITY
Start: 2023-04-27

## 2023-05-09 RX ORDER — ASCORBIC ACID, CHOLECALCIFEROL, .ALPHA.-TOCOPHEROL ACETATE, DL-, PYRIDOXINE, FOLIC ACID, CYANOCOBALAMIN, CALCIUM, FERROUS FUMARATE, MAGNESIUM, DOCONEXENT 85; 200; 10; 25; 1; 12; 140; 27; 45; 300 [IU]/1; [IU]/1; [IU]/1; [IU]/1; MG/1; UG/1; MG/1; MG/1; MG/1; MG/1
1 CAPSULE, GELATIN COATED ORAL DAILY
COMMUNITY
Start: 2023-03-20

## 2023-05-09 RX ORDER — DIPHENHYDRAMINE HCL 25 MG
CAPSULE ORAL EVERY 24 HOURS
COMMUNITY
Start: 2022-12-01 | End: 2023-05-10

## 2023-05-09 RX ORDER — MULTIVITAMIN,THER AND MINERALS
TABLET ORAL
COMMUNITY

## 2023-05-10 ENCOUNTER — OFFICE VISIT (OUTPATIENT)
Dept: INTERNAL MEDICINE | Age: 43
End: 2023-05-10

## 2023-05-10 ENCOUNTER — LAB SERVICES (OUTPATIENT)
Dept: LAB | Age: 43
End: 2023-05-10

## 2023-05-10 VITALS
TEMPERATURE: 98.2 F | DIASTOLIC BLOOD PRESSURE: 75 MMHG | RESPIRATION RATE: 16 BRPM | HEART RATE: 58 BPM | HEIGHT: 63 IN | SYSTOLIC BLOOD PRESSURE: 115 MMHG | BODY MASS INDEX: 30.48 KG/M2 | WEIGHT: 172 LBS | OXYGEN SATURATION: 100 %

## 2023-05-10 DIAGNOSIS — D75.839 THROMBOCYTOSIS: ICD-10-CM

## 2023-05-10 DIAGNOSIS — R07.89 ATYPICAL CHEST PAIN: ICD-10-CM

## 2023-05-10 DIAGNOSIS — K21.9 GASTROESOPHAGEAL REFLUX DISEASE, UNSPECIFIED WHETHER ESOPHAGITIS PRESENT: Primary | ICD-10-CM

## 2023-05-10 LAB
BASOPHILS # BLD: 0.1 K/MCL (ref 0–0.3)
BASOPHILS NFR BLD: 1 %
DEPRECATED RDW RBC: 45 FL (ref 39–50)
EOSINOPHIL # BLD: 0.3 K/MCL (ref 0–0.5)
EOSINOPHIL NFR BLD: 3 %
ERYTHROCYTE [DISTWIDTH] IN BLOOD: 13.5 % (ref 11–15)
HCT VFR BLD CALC: 37.8 % (ref 36–46.5)
HGB BLD-MCNC: 12.5 G/DL (ref 12–15.5)
IMM GRANULOCYTES # BLD AUTO: 0 K/MCL (ref 0–0.2)
IMM GRANULOCYTES # BLD: 0 %
LYMPHOCYTES # BLD: 2.6 K/MCL (ref 1–4.8)
LYMPHOCYTES NFR BLD: 28 %
MCH RBC QN AUTO: 30 PG (ref 26–34)
MCHC RBC AUTO-ENTMCNC: 33.1 G/DL (ref 32–36.5)
MCV RBC AUTO: 90.6 FL (ref 78–100)
MONOCYTES # BLD: 0.7 K/MCL (ref 0.3–0.9)
MONOCYTES NFR BLD: 7 %
NEUTROPHILS # BLD: 5.9 K/MCL (ref 1.8–7.7)
NEUTROPHILS NFR BLD: 61 %
NRBC BLD MANUAL-RTO: 0 /100 WBC
PLATELET # BLD AUTO: 520 K/MCL (ref 140–450)
RBC # BLD: 4.17 MIL/MCL (ref 4–5.2)
WBC # BLD: 9.6 K/MCL (ref 4.2–11)

## 2023-05-10 PROCEDURE — 36415 COLL VENOUS BLD VENIPUNCTURE: CPT | Performed by: INTERNAL MEDICINE

## 2023-05-10 PROCEDURE — 99214 OFFICE O/P EST MOD 30 MIN: CPT | Performed by: INTERNAL MEDICINE

## 2023-05-10 PROCEDURE — 93000 ELECTROCARDIOGRAM COMPLETE: CPT | Performed by: INTERNAL MEDICINE

## 2023-05-10 PROCEDURE — 85025 COMPLETE CBC W/AUTO DIFF WBC: CPT | Performed by: INTERNAL MEDICINE

## 2023-05-10 RX ORDER — PANTOPRAZOLE SODIUM 40 MG/1
40 TABLET, DELAYED RELEASE ORAL DAILY
Qty: 90 TABLET | Refills: 1 | Status: SHIPPED | OUTPATIENT
Start: 2023-05-10

## 2023-05-10 ASSESSMENT — ENCOUNTER SYMPTOMS
SHORTNESS OF BREATH: 0
ABDOMINAL DISTENTION: 0
FEVER: 0
SORE THROAT: 0
HEADACHES: 0
EYE REDNESS: 0
EYE DISCHARGE: 0
CONSTIPATION: 0
BLOOD IN STOOL: 0
SINUS PRESSURE: 0
UNEXPECTED WEIGHT CHANGE: 0
WEAKNESS: 0
NAUSEA: 0
ABDOMINAL PAIN: 0
DIZZINESS: 0
TROUBLE SWALLOWING: 0
EYE PAIN: 0
NERVOUS/ANXIOUS: 0
COLOR CHANGE: 0
BACK PAIN: 0
CHEST TIGHTNESS: 0
LIGHT-HEADEDNESS: 0
FATIGUE: 0
APPETITE CHANGE: 0
NUMBNESS: 0
WHEEZING: 0
COUGH: 0
DIARRHEA: 0

## 2023-05-11 ENCOUNTER — TELEPHONE (OUTPATIENT)
Dept: INTERNAL MEDICINE | Age: 43
End: 2023-05-11

## 2023-05-11 DIAGNOSIS — D75.839 THROMBOCYTOSIS: Primary | ICD-10-CM

## 2023-05-11 PROBLEM — D75.838 REACTIVE THROMBOCYTOSIS: Status: ACTIVE | Noted: 2023-02-19

## 2023-05-17 ENCOUNTER — TELEPHONE (OUTPATIENT)
Dept: INTERNAL MEDICINE | Age: 43
End: 2023-05-17

## 2023-06-07 ENCOUNTER — EXTERNAL RECORD (OUTPATIENT)
Dept: HEALTH INFORMATION MANAGEMENT | Facility: OTHER | Age: 43
End: 2023-06-07

## 2023-06-11 DIAGNOSIS — J30.9 ALLERGIC RHINITIS, UNSPECIFIED SEASONALITY, UNSPECIFIED TRIGGER: ICD-10-CM

## 2023-06-12 RX ORDER — FLUTICASONE PROPIONATE 50 MCG
SPRAY, SUSPENSION (ML) NASAL
Qty: 48 ML | Refills: 1 | Status: SHIPPED | OUTPATIENT
Start: 2023-06-12

## 2023-06-13 ENCOUNTER — CLINICAL ABSTRACT (OUTPATIENT)
Dept: HEALTH INFORMATION MANAGEMENT | Facility: OTHER | Age: 43
End: 2023-06-13

## 2023-06-15 ENCOUNTER — EXTERNAL LAB (OUTPATIENT)
Dept: OTHER | Age: 43
End: 2023-06-15

## 2023-06-15 ENCOUNTER — EXTERNAL RECORD (OUTPATIENT)
Dept: HEALTH INFORMATION MANAGEMENT | Facility: OTHER | Age: 43
End: 2023-06-15

## 2023-06-15 LAB — LAB RESULT: NORMAL

## 2024-01-22 ENCOUNTER — OFFICE VISIT (OUTPATIENT)
Dept: INTERNAL MEDICINE | Age: 44
End: 2024-01-22

## 2024-01-22 VITALS
DIASTOLIC BLOOD PRESSURE: 83 MMHG | HEIGHT: 63 IN | WEIGHT: 168.4 LBS | OXYGEN SATURATION: 96 % | SYSTOLIC BLOOD PRESSURE: 122 MMHG | BODY MASS INDEX: 29.84 KG/M2 | RESPIRATION RATE: 17 BRPM | TEMPERATURE: 98.1 F | HEART RATE: 77 BPM

## 2024-01-22 DIAGNOSIS — R73.9 HYPERGLYCEMIA: ICD-10-CM

## 2024-01-22 DIAGNOSIS — Z00.00 ANNUAL PHYSICAL EXAM: Primary | ICD-10-CM

## 2024-01-22 DIAGNOSIS — J30.9 ALLERGIC RHINITIS, UNSPECIFIED SEASONALITY, UNSPECIFIED TRIGGER: ICD-10-CM

## 2024-01-22 DIAGNOSIS — D75.839 THROMBOCYTOSIS: ICD-10-CM

## 2024-01-22 DIAGNOSIS — Q26.8 SCIMITAR SYNDROME: ICD-10-CM

## 2024-01-22 DIAGNOSIS — R63.5 WEIGHT GAIN: ICD-10-CM

## 2024-01-22 DIAGNOSIS — K21.9 GASTROESOPHAGEAL REFLUX DISEASE, UNSPECIFIED WHETHER ESOPHAGITIS PRESENT: ICD-10-CM

## 2024-01-22 DIAGNOSIS — E03.9 HYPOTHYROIDISM (ACQUIRED): ICD-10-CM

## 2024-01-22 DIAGNOSIS — M54.50 ACUTE RIGHT-SIDED LOW BACK PAIN WITHOUT SCIATICA: ICD-10-CM

## 2024-01-22 PROCEDURE — 99396 PREV VISIT EST AGE 40-64: CPT | Performed by: INTERNAL MEDICINE

## 2024-01-22 PROCEDURE — 99213 OFFICE O/P EST LOW 20 MIN: CPT | Performed by: INTERNAL MEDICINE

## 2024-01-22 RX ORDER — OMEPRAZOLE 40 MG/1
40 CAPSULE, DELAYED RELEASE ORAL DAILY
COMMUNITY
Start: 2023-10-14

## 2024-01-22 RX ORDER — CYCLOBENZAPRINE HCL 5 MG
5 TABLET ORAL 3 TIMES DAILY PRN
Qty: 30 TABLET | Refills: 0 | Status: SHIPPED | OUTPATIENT
Start: 2024-01-22

## 2024-01-22 ASSESSMENT — PATIENT HEALTH QUESTIONNAIRE - PHQ9
SUM OF ALL RESPONSES TO PHQ9 QUESTIONS 1 AND 2: 0
2. FEELING DOWN, DEPRESSED OR HOPELESS: NOT AT ALL
CLINICAL INTERPRETATION OF PHQ2 SCORE: NO FURTHER SCREENING NEEDED
SUM OF ALL RESPONSES TO PHQ9 QUESTIONS 1 AND 2: 0
1. LITTLE INTEREST OR PLEASURE IN DOING THINGS: NOT AT ALL

## 2024-02-13 ENCOUNTER — LAB SERVICES (OUTPATIENT)
Dept: LAB | Age: 44
End: 2024-02-13

## 2024-02-13 DIAGNOSIS — Z00.00 ANNUAL PHYSICAL EXAM: ICD-10-CM

## 2024-02-13 DIAGNOSIS — K21.9 GASTROESOPHAGEAL REFLUX DISEASE, UNSPECIFIED WHETHER ESOPHAGITIS PRESENT: ICD-10-CM

## 2024-02-13 DIAGNOSIS — D75.839 THROMBOCYTOSIS: ICD-10-CM

## 2024-02-13 DIAGNOSIS — E03.9 HYPOTHYROIDISM (ACQUIRED): ICD-10-CM

## 2024-02-13 DIAGNOSIS — J30.9 ALLERGIC RHINITIS, UNSPECIFIED SEASONALITY, UNSPECIFIED TRIGGER: ICD-10-CM

## 2024-02-13 DIAGNOSIS — R73.9 HYPERGLYCEMIA: ICD-10-CM

## 2024-02-13 LAB
ALBUMIN SERPL-MCNC: 3.7 G/DL (ref 3.6–5.1)
ALBUMIN/GLOB SERPL: 1.1 {RATIO} (ref 1–2.4)
ALP SERPL-CCNC: 79 UNITS/L (ref 45–117)
ALT SERPL-CCNC: 37 UNITS/L
ANION GAP SERPL CALC-SCNC: 12 MMOL/L (ref 7–19)
AST SERPL-CCNC: 22 UNITS/L
BASOPHILS # BLD: 0.1 K/MCL (ref 0–0.3)
BASOPHILS NFR BLD: 1 %
BILIRUB SERPL-MCNC: 0.5 MG/DL (ref 0.2–1)
BUN SERPL-MCNC: 11 MG/DL (ref 6–20)
BUN/CREAT SERPL: 13 (ref 7–25)
CALCIUM SERPL-MCNC: 9.4 MG/DL (ref 8.4–10.2)
CHLORIDE SERPL-SCNC: 103 MMOL/L (ref 97–110)
CHOLEST SERPL-MCNC: 195 MG/DL
CHOLEST/HDLC SERPL: 4.4 {RATIO}
CO2 SERPL-SCNC: 30 MMOL/L (ref 21–32)
CREAT SERPL-MCNC: 0.87 MG/DL (ref 0.51–0.95)
DEPRECATED RDW RBC: 45 FL (ref 39–50)
EGFRCR SERPLBLD CKD-EPI 2021: 86 ML/MIN/{1.73_M2}
EOSINOPHIL # BLD: 0.2 K/MCL (ref 0–0.5)
EOSINOPHIL NFR BLD: 2 %
ERYTHROCYTE [DISTWIDTH] IN BLOOD: 13.5 % (ref 11–15)
FASTING DURATION TIME PATIENT: 11 HOURS (ref 0–999)
GLOBULIN SER-MCNC: 3.4 G/DL (ref 2–4)
GLUCOSE SERPL-MCNC: 90 MG/DL (ref 70–99)
HCT VFR BLD CALC: 39.6 % (ref 36–46.5)
HDLC SERPL-MCNC: 44 MG/DL
HGB BLD-MCNC: 12.9 G/DL (ref 12–15.5)
IMM GRANULOCYTES # BLD AUTO: 0 K/MCL (ref 0–0.2)
IMM GRANULOCYTES # BLD: 0 %
LDLC SERPL CALC-MCNC: 134 MG/DL
LYMPHOCYTES # BLD: 2.3 K/MCL (ref 1–4.8)
LYMPHOCYTES NFR BLD: 22 %
MCH RBC QN AUTO: 29.5 PG (ref 26–34)
MCHC RBC AUTO-ENTMCNC: 32.6 G/DL (ref 32–36.5)
MCV RBC AUTO: 90.4 FL (ref 78–100)
MONOCYTES # BLD: 0.7 K/MCL (ref 0.3–0.9)
MONOCYTES NFR BLD: 7 %
NEUTROPHILS # BLD: 6.8 K/MCL (ref 1.8–7.7)
NEUTROPHILS NFR BLD: 68 %
NONHDLC SERPL-MCNC: 151 MG/DL
NRBC BLD MANUAL-RTO: 0 /100 WBC
PLATELET # BLD AUTO: 550 K/MCL (ref 140–450)
POTASSIUM SERPL-SCNC: 4.7 MMOL/L (ref 3.4–5.1)
PROT SERPL-MCNC: 7.1 G/DL (ref 6.4–8.2)
RBC # BLD: 4.38 MIL/MCL (ref 4–5.2)
SODIUM SERPL-SCNC: 140 MMOL/L (ref 135–145)
TRIGL SERPL-MCNC: 84 MG/DL
TSH SERPL-ACNC: 2.16 MCUNITS/ML (ref 0.35–5)
WBC # BLD: 10.1 K/MCL (ref 4.2–11)

## 2024-02-13 PROCEDURE — 80061 LIPID PANEL: CPT | Performed by: INTERNAL MEDICINE

## 2024-02-13 PROCEDURE — 80050 GENERAL HEALTH PANEL: CPT | Performed by: INTERNAL MEDICINE

## 2024-02-13 PROCEDURE — 36415 COLL VENOUS BLD VENIPUNCTURE: CPT | Performed by: INTERNAL MEDICINE

## 2024-02-14 ENCOUNTER — TELEPHONE (OUTPATIENT)
Dept: INTERNAL MEDICINE | Age: 44
End: 2024-02-14

## 2024-02-26 ENCOUNTER — TELEPHONE (OUTPATIENT)
Dept: HEMATOLOGY/ONCOLOGY | Age: 44
End: 2024-02-26

## 2024-06-06 DIAGNOSIS — J30.9 ALLERGIC RHINITIS, UNSPECIFIED SEASONALITY, UNSPECIFIED TRIGGER: ICD-10-CM

## 2024-06-06 RX ORDER — FLUTICASONE PROPIONATE 50 MCG
SPRAY, SUSPENSION (ML) NASAL
Qty: 48 ML | Refills: 1 | Status: SHIPPED | OUTPATIENT
Start: 2024-06-06

## 2024-06-23 DIAGNOSIS — E03.9 HYPOTHYROIDISM (ACQUIRED): ICD-10-CM

## 2024-06-24 RX ORDER — LEVOTHYROXINE SODIUM 0.05 MG/1
50 TABLET ORAL DAILY
Qty: 90 TABLET | Refills: 1 | Status: SHIPPED | OUTPATIENT
Start: 2024-06-24

## 2024-07-22 ENCOUNTER — OFFICE VISIT (OUTPATIENT)
Dept: INTERNAL MEDICINE CLINIC | Facility: CLINIC | Age: 44
End: 2024-07-22
Payer: COMMERCIAL

## 2024-07-22 VITALS
SYSTOLIC BLOOD PRESSURE: 100 MMHG | DIASTOLIC BLOOD PRESSURE: 72 MMHG | HEART RATE: 72 BPM | BODY MASS INDEX: 30.84 KG/M2 | HEIGHT: 62.5 IN | TEMPERATURE: 97 F | RESPIRATION RATE: 12 BRPM | WEIGHT: 171.88 LBS

## 2024-07-22 DIAGNOSIS — Z01.419 VISIT FOR PELVIC EXAM: ICD-10-CM

## 2024-07-22 DIAGNOSIS — Z00.00 PHYSICAL EXAM, ANNUAL: Primary | ICD-10-CM

## 2024-07-22 DIAGNOSIS — E04.2 MULTIPLE THYROID NODULES: ICD-10-CM

## 2024-07-22 DIAGNOSIS — Z12.31 ENCOUNTER FOR SCREENING MAMMOGRAM FOR MALIGNANT NEOPLASM OF BREAST: ICD-10-CM

## 2024-07-22 DIAGNOSIS — Z23 NEED FOR VACCINATION: ICD-10-CM

## 2024-07-22 PROBLEM — O00.102 RUPTURED LEFT TUBAL ECTOPIC PREGNANCY CAUSING HEMOPERITONEUM (HCC): Status: RESOLVED | Noted: 2021-09-24 | Resolved: 2024-07-22

## 2024-07-22 PROBLEM — Z98.890 STATUS POST EXPLORATORY LAPAROTOMY: Status: RESOLVED | Noted: 2021-09-24 | Resolved: 2024-07-22

## 2024-07-22 PROBLEM — Z87.59 HISTORY OF ECTOPIC PREGNANCY: Status: ACTIVE | Noted: 2024-07-22

## 2024-07-22 PROBLEM — O00.90 RUPTURED ECTOPIC PREGNANCY (HCC): Status: RESOLVED | Noted: 2021-09-24 | Resolved: 2024-07-22

## 2024-07-22 PROBLEM — O00.102: Status: RESOLVED | Noted: 2021-09-21 | Resolved: 2024-07-22

## 2024-07-22 PROBLEM — D64.9 ANEMIA: Status: RESOLVED | Noted: 2021-09-24 | Resolved: 2024-07-22

## 2024-07-22 PROBLEM — K66.1 RUPTURED LEFT TUBAL ECTOPIC PREGNANCY CAUSING HEMOPERITONEUM (HCC): Status: RESOLVED | Noted: 2021-09-24 | Resolved: 2024-07-22

## 2024-07-22 PROCEDURE — 88175 CYTOPATH C/V AUTO FLUID REDO: CPT | Performed by: INTERNAL MEDICINE

## 2024-07-22 PROCEDURE — 87624 HPV HI-RISK TYP POOLED RSLT: CPT | Performed by: INTERNAL MEDICINE

## 2024-07-22 RX ORDER — FLUOCINONIDE 0.5 MG/G
OINTMENT TOPICAL AS NEEDED
COMMUNITY

## 2024-07-22 NOTE — PROGRESS NOTES
[facility]    CHIEF COMPLAINT:   Chief Complaint   Patient presents with    Routine Physical     Sees gyne. 7/7/22-pap. 9/7/22-mammo.          HPI:   Fernanda Agarwal is a 43 year old female who presents for a complete physical exam.  New pt to the office today.    Symptoms: denies discharge, itching, burning or dysuria.     Pap done 7/2022 noted as normal but no official report available today.     Mammo done 9/2022 normal.     Due tdap. Done today.   Up to date covid booster.     history of scimitar syndrome (congenital heart disease), s/p surgical repair at age 34. She is on aspirin. Sees cardiology dr. Altamirano once a year. No cardiac symptoms.     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 She was on methimazole for about 10 years. In the past 4-5 years she is now hypothyroid and on levothyroxine. Last tsh 2/2024 was normal.     history of ruptured ectopic pregnancy in 2021. She is s/p left salpingectomy.     Exercise: minimal. Walks sporadically.       Wt Readings from Last 6 Encounters:   07/22/24 171 lb 14.4 oz (78 kg)   04/25/22 163 lb (73.9 kg)   11/12/21 160 lb 12.8 oz (72.9 kg)   11/08/21 160 lb (72.6 kg)   10/19/21 169 lb (76.7 kg)   10/13/21 159 lb (72.1 kg)     Body mass index is 30.94 kg/m².       Current Outpatient Medications   Medication Sig Dispense Refill    fluocinonide 0.05 % External Ointment Apply topically as needed.      hydrocortisone 2.5 % External Ointment Apply topically as needed.      BIOTIN OR Take by mouth. Takes 1-2 times per week      Multiple Vitamins-Minerals (MULTIVITAL) Oral Tab Take 1 tablet by mouth daily with dinner.  0    Cholecalciferol (VITAMIN D3) 250 MCG (62655 UT) Oral Tab Take 5,000 Units by mouth daily. 30 tablet 0    cetirizine 10 MG Oral Tab Take 1 tablet (10 mg total) by mouth daily.      Vitamin B-12 1000 MCG Oral Tab Take 1 tablet (1,000 mcg total) by mouth daily.      Fluticasone Propionate 50 MCG/ACT Nasal Suspension 2  sprays by Nasal route daily.      Levothyroxine Sodium 50 MCG Oral Tab Take 1 tablet (50 mcg total) by mouth every morning. Take on an empty stomach      aspirin 81 MG Oral Tab Take 1 tablet (81 mg total) by mouth daily.        Past Medical History:    ANEMIA    only post op given iron supplements for 2 mos    Collapsed lung    chronic cough and total lung collapse in ~ 2013 and evaluation during this showed a congenital abnormality in the heart     Congenital anomaly of heart (HCC)    cabg in 2014    Disorder of thyroid    Elevated platelet count    History of open heart surgery    for Scimitar syndrome    Hx of transfusion of packed red blood cells    for ruptured ectopic pregnancy     Hypothyroidism    diagnosed with hyperthyroidism in 2013 and treated with Tapazole for ~6 to 7 years, later became hypothyroid - on levothyroxine for ~3 years    Multiple thyroid nodules    Pap smear for cervical cancer screening    Negative    Ruptured left tubal ectopic pregnancy causing hemoperitoneum (HCC)    S/p methotrexate for left ectopic pregnancy on 9/2/2021 ->presented in hypovolemic shock with ruptured ectopic pregnancy 9/24/2021 -> to OR for ex-lap, left salpingectomy, evacuation of hemoperitoneum. Dr Kristel Garcia, [facility].     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 Patient underwent open heart surgery at [facility] for this.       Past Surgical History:   Procedure Laterality Date    Angiogram  09/2013    Cabg      2014    Open heart surgical profile      Open heart surgery for Scimitar Syndrome (congenital venous anomaly) at [facility]    Other surgical history  09/24/2021    exploratory laprascopy left salpingectomy, 8wks    Salpingectomy Left 09/23/2021    Ex-laparotomy, LEFT salpingectomy, evacuation of hemoperitoneum for LEFT  sided ruptured ectopic pregnancy. Dr. Kristel Garcia, [facility].       Family History   Problem Relation Age of Onset    Diabetes Mother     Hypertension Mother     Diabetes Father     Hypertension Father     Obesity Sister     No Known Problems Maternal Grandmother     Diabetes Maternal Grandfather     Heart Attack Paternal Grandfather 70    No Known Problems Daughter     Thyroid disease Neg       Social History:   Social History     Socioeconomic History    Marital status:     Number of children: 1   Tobacco Use    Smoking status: Never    Smokeless tobacco: Never   Vaping Use    Vaping status: Never Used   Substance and Sexual Activity    Alcohol use: No    Drug use: No    Sexual activity: Yes     Partners: Male   Other Topics Concern    Exercise Yes     Comment: walking     Social Determinants of Health      [facility]    Housing Stability     Occ: . : yes. Children: yes, 1 daughter age 15.   Exercise: walking.  Diet:  eats a healthy diet     REVIEW OF SYSTEMS:   GENERAL: feels well otherwise  SKIN: denies any unusual skin lesions  EYES:denies blurred vision or double vision  HEENT: denies nasal congestion, sinus pain or ST  LUNGS: denies shortness of breath with exertion  CARDIOVASCULAR: denies chest pain on exertion  GI: denies abdominal pain,denies heartburn  : denies dysuria, vaginal discharge or itching,periods regular  MUSCULOSKELETAL: denies back pain  NEURO: denies headaches  PSYCHE: denies depression or anxiety  HEMATOLOGIC: denies hx of anemia  ENDOCRINE: hypothyroid  ALL/ASTHMA: has allergies    EXAM:   /72 (BP Location: Right arm, Patient Position: Sitting, Cuff Size: adult)   Pulse 72   Temp 97.3 °F (36.3 °C) (Temporal)   Resp 12   Ht 5' 2.5\" (1.588 m)   Wt 171 lb 14.4 oz (78 kg)   LMP 07/07/2024 (Exact Date)   Breastfeeding No   BMI 30.94 kg/m²   Body mass index is 30.94 kg/m².   GENERAL: well developed, well  nourished,in no apparent distress  SKIN: no rashes,no suspicious lesions  HEENT: atraumatic, normocephalic,ears and throat are clear  EYES:PERRLA, conjunctiva are clear  NECK: supple,no adenopathy,no bruits  CHEST: no chest tenderness  LUNGS: clear to auscultation  CARDIO: nl s1 and s2, RRR without murmur  GI: good BS's,no masses, HSM or tenderness  BREAST: no dominant or suspicious mass, no nipple discharge  GENITAL/URINARY:  External Genitalia:  General appearance; normal, Hair distribution; normal, Lesions absent, Urethral Meatus:  Size normal, Location normal, Lesions absent, Prolapse absent, Vagina:  General appearance normal, Lesions absent, Pelvic support normal, Cervix:  General appearance normal, Discharge absent, Tenderness absent, Enlargement absent, Uterus:  Masses absent, Adnexa:  Masses absent, Tenderness absent, Anus/Perineum:  Lesions absent and Masses absent  Pap done today.   MUSCULOSKELETAL: back is not tender,FROM of the back  EXTREMITIES: no cyanosis, clubbing or edema  NEURO: Oriented times three,cranial nerves are intact,motor and sensory are grossly intact    Labs:   Lab Results   Component Value Date/Time    WBC 10.0 04/25/2022 03:24 PM    HGB 13.2 04/25/2022 03:24 PM    .0 (H) 04/25/2022 03:24 PM      Lab Results   Component Value Date/Time    GLU 96 04/25/2022 03:24 PM     04/25/2022 03:24 PM    K 4.1 04/25/2022 03:24 PM     04/25/2022 03:24 PM    CO2 26.0 04/25/2022 03:24 PM    CREATSERUM 0.89 04/25/2022 03:24 PM    CA 9.6 04/25/2022 03:24 PM    ALB 3.8 04/25/2022 03:24 PM    TP 7.7 04/25/2022 03:24 PM    ALKPHO 77 04/25/2022 03:24 PM    AST 21 04/25/2022 03:24 PM    ALT 31 04/25/2022 03:24 PM    BILT 0.2 04/25/2022 03:24 PM        No results found for: \"CHOLEST\", \"HDL\", \"TRIG\", \"LDL\", \"NONHDLC\"    No results found for: \"A1C\"   Vitamin D:    No results found for: \"VITD\"        ASSESSMENT AND PLAN:   Fernanda Agarwal is a 43 year old female who presents for a complete  physical exam.   1. Physical exam, annual  Labs reviewed.   Pap done. Breast exam done.   Tdap done today.  Urged regular exercise.     2. Need for vaccination  - TdaP (Adacel, Boostrix) [05074]    3. Multiple thyroid nodules  Will check us. She had nodules when she was hyperthyroid.   - US THYROID (CPT=76536); Future    4. Encounter for screening mammogram for malignant neoplasm of breast  - [facility] JENN 2D+3D SCREENING BILAT (CPT=77067/45898); Future    5. Visit for pelvic exam  - SPECIMEN HANDLING,DR OFF->LAB  - ThinPrep PAP with HPV Reflex Request B; Future  - ThinPrep PAP with HPV Reflex Request B          Return in about 1 year (around 7/22/2025) for physical.      Candida Leonard MD

## 2024-07-26 LAB
.: NORMAL
.: NORMAL
HPV E6+E7 MRNA CVX QL NAA+PROBE: NEGATIVE

## 2024-08-06 ENCOUNTER — HOSPITAL ENCOUNTER (OUTPATIENT)
Dept: MAMMOGRAPHY | Facility: HOSPITAL | Age: 44
Discharge: HOME | End: 2024-08-06
Attending: INTERNAL MEDICINE
Payer: COMMERCIAL

## 2024-08-06 DIAGNOSIS — Z12.31 ENCOUNTER FOR SCREENING MAMMOGRAM FOR MALIGNANT NEOPLASM OF BREAST: ICD-10-CM

## 2024-08-06 PROCEDURE — 77067 SCR MAMMO BI INCL CAD: CPT | Performed by: INTERNAL MEDICINE

## 2024-08-06 PROCEDURE — 77063 BREAST TOMOSYNTHESIS BI: CPT | Performed by: INTERNAL MEDICINE

## 2024-09-13 ENCOUNTER — HOSPITAL ENCOUNTER (OUTPATIENT)
Dept: ULTRASOUND IMAGING | Facility: HOSPITAL | Age: 44
Discharge: HOME | End: 2024-09-13
Attending: INTERNAL MEDICINE
Payer: COMMERCIAL

## 2024-09-13 DIAGNOSIS — E04.2 MULTIPLE THYROID NODULES: ICD-10-CM

## 2024-09-13 PROCEDURE — 76536 US EXAM OF HEAD AND NECK: CPT | Performed by: INTERNAL MEDICINE

## 2024-11-29 DIAGNOSIS — J30.9 ALLERGIC RHINITIS, UNSPECIFIED SEASONALITY, UNSPECIFIED TRIGGER: ICD-10-CM

## 2024-11-29 RX ORDER — FLUTICASONE PROPIONATE 50 MCG
SPRAY, SUSPENSION (ML) NASAL
Qty: 48 ML | Refills: 1 | Status: SHIPPED | OUTPATIENT
Start: 2024-11-29

## 2024-12-21 DIAGNOSIS — E03.9 HYPOTHYROIDISM (ACQUIRED): ICD-10-CM

## 2024-12-22 RX ORDER — LEVOTHYROXINE SODIUM 50 UG/1
50 TABLET ORAL DAILY
Qty: 90 TABLET | Refills: 1 | Status: SHIPPED | OUTPATIENT
Start: 2024-12-22

## 2025-02-04 ENCOUNTER — OFFICE VISIT (OUTPATIENT)
Dept: INTERNAL MEDICINE CLINIC | Facility: CLINIC | Age: 45
End: 2025-02-04
Payer: COMMERCIAL

## 2025-02-04 VITALS
DIASTOLIC BLOOD PRESSURE: 80 MMHG | RESPIRATION RATE: 16 BRPM | WEIGHT: 167 LBS | OXYGEN SATURATION: 98 % | HEIGHT: 62.5 IN | TEMPERATURE: 98 F | SYSTOLIC BLOOD PRESSURE: 110 MMHG | BODY MASS INDEX: 29.96 KG/M2 | HEART RATE: 69 BPM

## 2025-02-04 DIAGNOSIS — Z13.0 SCREENING FOR DEFICIENCY ANEMIA: ICD-10-CM

## 2025-02-04 DIAGNOSIS — Z13.228 SCREENING FOR ENDOCRINE, METABOLIC AND IMMUNITY DISORDER: ICD-10-CM

## 2025-02-04 DIAGNOSIS — Z13.0 SCREENING FOR ENDOCRINE, METABOLIC AND IMMUNITY DISORDER: ICD-10-CM

## 2025-02-04 DIAGNOSIS — E03.8 SECONDARY HYPOTHYROIDISM: ICD-10-CM

## 2025-02-04 DIAGNOSIS — H10.13 ALLERGIC CONJUNCTIVITIS OF BOTH EYES: Primary | ICD-10-CM

## 2025-02-04 DIAGNOSIS — J30.9 ACUTE ALLERGIC RHINITIS: ICD-10-CM

## 2025-02-04 DIAGNOSIS — Z00.00 ENCOUNTER FOR ANNUAL PHYSICAL EXAM: ICD-10-CM

## 2025-02-04 DIAGNOSIS — Z13.29 SCREENING FOR ENDOCRINE, METABOLIC AND IMMUNITY DISORDER: ICD-10-CM

## 2025-02-04 DIAGNOSIS — Z13.220 SCREENING FOR LIPID DISORDERS: ICD-10-CM

## 2025-02-04 DIAGNOSIS — Z13.1 SCREENING FOR DIABETES MELLITUS: ICD-10-CM

## 2025-02-04 PROCEDURE — 3074F SYST BP LT 130 MM HG: CPT | Performed by: NURSE PRACTITIONER

## 2025-02-04 PROCEDURE — 3008F BODY MASS INDEX DOCD: CPT | Performed by: NURSE PRACTITIONER

## 2025-02-04 PROCEDURE — 3079F DIAST BP 80-89 MM HG: CPT | Performed by: NURSE PRACTITIONER

## 2025-02-04 PROCEDURE — 99214 OFFICE O/P EST MOD 30 MIN: CPT | Performed by: NURSE PRACTITIONER

## 2025-02-04 PROCEDURE — G2211 COMPLEX E/M VISIT ADD ON: HCPCS | Performed by: NURSE PRACTITIONER

## 2025-02-04 RX ORDER — FLUTICASONE PROPIONATE 50 MCG
2 SPRAY, SUSPENSION (ML) NASAL DAILY
Qty: 16 G | Refills: 0 | Status: SHIPPED | OUTPATIENT
Start: 2025-02-04

## 2025-02-04 RX ORDER — AZELASTINE HYDROCHLORIDE 0.5 MG/ML
1 SOLUTION/ DROPS OPHTHALMIC 2 TIMES DAILY
Qty: 6 ML | Refills: 0 | Status: SHIPPED | OUTPATIENT
Start: 2025-02-04

## 2025-02-04 NOTE — PROGRESS NOTES
CHIEF COMPLAINT:     Chief Complaint   Patient presents with    Eye Problem     Last 5 days pt is having Swelling around her eyes in the AM's and will go down thru the day but not completley and will return to being very swollen the next morning, Watery eyes thru the day, No Pain, No Extra Discharge, No Itching, No Redness       HPI:   Fernanda Agarwal is a 44 year old female coming in with complaints of swelling/puffiness around her eyes x 5 days. Symptoms are usually there when she wakes up and gets better during the day. She does have watery eyes throughout the day. She denies any eye pain, vision changes, blurry vision, or double vision. No redness or thick discharge from the eyes. No known exposure to chemicals, new soaps, makeup, face wash, medications. Has been eating more salt than usual. Denies any tongue/throat swelling, hives, chest pain, shortness of breath. No fevers, chills, or cough.     She does have seasonal allergies and has had nasal congestion and runny nose as well. She is taking Zyrtec daily without significant relief.     She would like lab orders for her physical.    Past Medical History:    ANEMIA    only post op given iron supplements for 2 mos    Collapsed lung    chronic cough and total lung collapse in ~ 2013 and evaluation during this showed a congenital abnormality in the heart     Congenital anomaly of heart (HCC)    cabg in 2014    Disorder of thyroid    Elevated platelet count    History of open heart surgery    for Scimitar syndrome    Hx of transfusion of packed red blood cells    for ruptured ectopic pregnancy     Hypothyroidism    diagnosed with hyperthyroidism in 2013 and treated with Tapazole for ~6 to 7 years, later became hypothyroid - on levothyroxine for ~3 years    Multiple thyroid nodules    Pap smear for cervical cancer screening    Negative    Ruptured left tubal ectopic pregnancy causing hemoperitoneum (HCC)    S/p methotrexate for left ectopic pregnancy on 9/2/2021  ->presented in hypovolemic shock with ruptured ectopic pregnancy 9/24/2021 -> to OR for ex-lap, left salpingectomy, evacuation of hemoperitoneum. Dr Kristel Garcia, [facility].     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 Patient underwent open heart surgery at [facility] for this.       Past Surgical History:   Procedure Laterality Date    Angiogram  09/2013    Cabg      2014    Open heart surgical profile      Open heart surgery for Scimitar Syndrome (congenital venous anomaly) at [facility]    Other surgical history  09/24/2021    exploratory laprascopy left salpingectomy, 8wks    Salpingectomy Left 09/23/2021    Ex-laparotomy, LEFT salpingectomy, evacuation of hemoperitoneum for LEFT sided ruptured ectopic pregnancy. Dr. Kristel Garcia, [facility].       Social History:  Social History     Socioeconomic History    Marital status:     Number of children: 1   Tobacco Use    Smoking status: Never     Passive exposure: Never    Smokeless tobacco: Never   Vaping Use    Vaping status: Never Used   Substance and Sexual Activity    Alcohol use: No    Drug use: No    Sexual activity: Yes     Partners: Male   Other Topics Concern    Exercise Yes     Comment: walking     Social Drivers of Health      Received from [facility]    Housing Stability      Family History:  Family History   Problem Relation Age of Onset    Diabetes Mother     Hypertension Mother     Diabetes Father     Hypertension Father     Obesity Sister     No Known Problems Maternal Grandmother     Diabetes Maternal Grandfather     Heart Attack Paternal Grandfather 70    No Known Problems Daughter     Thyroid disease Neg       Allergies:  Allergies[1]   Current Meds:  Current Outpatient Medications   Medication Sig Dispense Refill    PROBIOTIC DAILY  OR Take 1 tablet by mouth as needed.      Cholecalciferol 125 MCG (5000 UT) Oral Tab Take 1 tablet (5,000 Units total) by mouth daily.      Azelastine HCl 0.05 % Ophthalmic Solution Place 1 drop into both eyes 2 (two) times daily. 6 mL 0    fluticasone propionate 50 MCG/ACT Nasal Suspension 2 sprays by Each Nare route daily. 16 g 0    fluocinonide 0.05 % External Ointment Apply topically as needed.      Multiple Vitamins-Minerals (MULTIVITAL) Oral Tab Take 1 tablet by mouth daily with dinner.  0    cetirizine 10 MG Oral Tab Take 1 tablet (10 mg total) by mouth daily.      Vitamin B-12 1000 MCG Oral Tab Take 1 tablet (1,000 mcg total) by mouth daily.      Fluticasone Propionate 50 MCG/ACT Nasal Suspension 2 sprays by Nasal route daily.      Levothyroxine Sodium 50 MCG Oral Tab Take 1 tablet (50 mcg total) by mouth every morning. Take on an empty stomach      aspirin 81 MG Oral Tab Take 1 tablet (81 mg total) by mouth daily.         Counseling given: Not Answered       REVIEW OF SYSTEMS:   See HPI.    EXAM:     /80 (BP Location: Left arm, Patient Position: Sitting, Cuff Size: adult)   Pulse 69   Temp 97.8 °F (36.6 °C) (Temporal)   Resp 16   Ht 5' 2.5\" (1.588 m)   Wt 167 lb (75.8 kg)   LMP 01/08/2025 (Exact Date)   SpO2 98%   BMI 30.06 kg/m²   Body mass index is 30.06 kg/m².   Vital signs reviewed. Appears stated age, well groomed, in no acute distress.  Physical Exam:  GENERAL: Patient is alert, awake and oriented, well developed, well nourished.  HEENT: Head: Normocephalic, atraumatic. Eyes: EOMI, PERRLA, conjunctivae clear bilaterally, no eye discharge. No swelling appreciated around her eyes currently.   HEART: RRR without murmur.  LUNGS: Clear to auscultation bilaterally, no rales/rhonchi/wheezing.  ABDOMEN: good BS's, no masses, HSM or tenderness  MUSCULOSKELETAL: No obvious joint deformity or swelling.   NEURO: Oriented time three.     LABS:      Lab Results   Component Value Date    WBC 10.0  04/25/2022    RBC 4.42 04/25/2022    HGB 13.2 04/25/2022    HCT 39.2 04/25/2022    MCV 88.7 04/25/2022    MCH 29.9 04/25/2022    MCHC 33.7 04/25/2022    RDW 12.5 04/25/2022    .0 (H) 04/25/2022      Lab Results   Component Value Date    GLU 96 04/25/2022    BUN 10 04/25/2022    CREATSERUM 0.89 04/25/2022    ANIONGAP 6 04/25/2022    GFRNAA 82 04/25/2022    GFRAA 94 04/25/2022    CA 9.6 04/25/2022    OSMOCALC 283 04/25/2022    ALKPHO 77 04/25/2022    AST 21 04/25/2022    ALT 31 04/25/2022    BILT 0.2 04/25/2022    TP 7.7 04/25/2022    ALB 3.8 04/25/2022    GLOBULIN 3.9 04/25/2022     04/25/2022    K 4.1 04/25/2022     04/25/2022    CO2 26.0 04/25/2022      No results found for: \"CHOLEST\", \"TRIG\", \"HDL\", \"LDL\", \"VLDL\", \"TCHDLRATIO\", \"NONHDLC\", \"CHOLHDLRATIO\", \"CALCNONHDL\"   Lab Results   Component Value Date    TSHT4 2.12 08/20/2021      No results found for: \"EAG\", \"A1C\"     IMAGING:     No results found.     ASSESSMENT AND PLAN:   1. Allergic conjunctivitis of both eyes  -Start azelastine eye drops  -Avoid known allergies  -Use cool compresses  -Avoid eye makeup  -Use unscented face wash/soaps and moisturizer   - Azelastine HCl 0.05 % Ophthalmic Solution; Place 1 drop into both eyes 2 (two) times daily.  Dispense: 6 mL; Refill: 0    2. Acute allergic rhinitis  -Switch to Allegra or Claritin daily  -Start Flonase nasal spray  - fluticasone propionate 50 MCG/ACT Nasal Suspension; 2 sprays by Each Nare route daily.  Dispense: 16 g; Refill: 0    3. Encounter for annual physical exam  -Do labs prior to annual PE  - CBC With Differential With Platelet  - Comp Metabolic Panel  - Lipid Panel  - Hemoglobin A1C  - TSH [899] [Q]    4. Secondary hypothyroidism  - TSH [899] [Q]    5. Screening for deficiency anemia  - CBC With Differential With Platelet    6. Screening for endocrine, metabolic and immunity disorder  - Comp Metabolic Panel    7. Screening for lipid disorders  - Lipid Panel    8. Screening for  diabetes mellitus  - Hemoglobin A1C     The patient indicates understanding of these issues and agrees to the plan.  Return for as scheduled for physical or sooner as needed.    Hanh Seals, APRN  2/4/2025         [1]   Allergies  Allergen Reactions    Metronidazole RASH and HIVES    Peach RASH    Prunus Persica RASH    Seasonal Runny nose and Coughing

## 2025-02-11 ENCOUNTER — OFFICE VISIT (OUTPATIENT)
Dept: INTERNAL MEDICINE CLINIC | Facility: CLINIC | Age: 45
End: 2025-02-11
Payer: COMMERCIAL

## 2025-02-11 VITALS
SYSTOLIC BLOOD PRESSURE: 122 MMHG | HEIGHT: 62.5 IN | TEMPERATURE: 98 F | DIASTOLIC BLOOD PRESSURE: 76 MMHG | HEART RATE: 76 BPM | BODY MASS INDEX: 29.85 KG/M2 | RESPIRATION RATE: 12 BRPM | WEIGHT: 166.38 LBS

## 2025-02-11 DIAGNOSIS — H02.849 SWELLING OF EYELID, UNSPECIFIED LATERALITY: Primary | ICD-10-CM

## 2025-02-11 PROCEDURE — 99214 OFFICE O/P EST MOD 30 MIN: CPT | Performed by: INTERNAL MEDICINE

## 2025-02-11 PROCEDURE — 3008F BODY MASS INDEX DOCD: CPT | Performed by: INTERNAL MEDICINE

## 2025-02-11 PROCEDURE — G2211 COMPLEX E/M VISIT ADD ON: HCPCS | Performed by: INTERNAL MEDICINE

## 2025-02-11 PROCEDURE — 3078F DIAST BP <80 MM HG: CPT | Performed by: INTERNAL MEDICINE

## 2025-02-11 PROCEDURE — 3074F SYST BP LT 130 MM HG: CPT | Performed by: INTERNAL MEDICINE

## 2025-02-11 RX ORDER — OLOPATADINE HYDROCHLORIDE 1 MG/ML
1 SOLUTION/ DROPS OPHTHALMIC 2 TIMES DAILY
Qty: 5 ML | Refills: 1 | Status: SHIPPED | OUTPATIENT
Start: 2025-02-11

## 2025-02-11 RX ORDER — METHYLPREDNISOLONE 4 MG/1
TABLET ORAL
Qty: 1 EACH | Refills: 0 | Status: SHIPPED | OUTPATIENT
Start: 2025-02-11

## 2025-02-11 NOTE — PROGRESS NOTES
[facility]    CHIEF COMPLAINT:    Chief Complaint   Patient presents with    Follow - Up     Continues to experience swelling and watery eyes. Saw Opth.   7/22/24-pap. 8/6/24-mammo.         HISTORY OF PRESENT ILLNESS:  Complains of swelling of the eyelids and watery eyes for 2 weeks.   Wakes up in the am with swelling in the eyelids. Gets better through the course of the day. Eyes are very watery throughout the day.   No white discharge, no crusting.   No fever.   No triggers or increasing or decreasing factors.   She is using azelastine drops and also taking zyrtec every day but still has the symptoms.   She saw ophthalmology and had an eye exam and was told her eyes were fine and she should follow up with pcp.  No vision changes.   Eyelids feel heavy.   No swelling anywhere else.     REVIEW OF SYSTEMS:  See HPI    Current Medications:    Current Outpatient Medications   Medication Sig Dispense Refill    olopatadine 0.1 % Ophthalmic Solution Place 1 drop into both eyes 2 (two) times daily. 5 mL 1    methylPREDNISolone (MEDROL) 4 MG Oral Tablet Therapy Pack As directed. 1 each 0    PROBIOTIC DAILY OR Take 1 tablet by mouth as needed.      Cholecalciferol 125 MCG (5000 UT) Oral Tab Take 1 tablet (5,000 Units total) by mouth daily.      fluticasone propionate 50 MCG/ACT Nasal Suspension 2 sprays by Each Nare route daily. 16 g 0    fluocinonide 0.05 % External Ointment Apply topically as needed.      Multiple Vitamins-Minerals (MULTIVITAL) Oral Tab Take 1 tablet by mouth daily with dinner.  0    cetirizine 10 MG Oral Tab Take 1 tablet (10 mg total) by mouth daily.      Vitamin B-12 1000 MCG Oral Tab Take 1 tablet (1,000 mcg total) by mouth daily.      Levothyroxine Sodium 50 MCG Oral Tab Take 1 tablet (50 mcg total) by mouth every morning. Take on an empty stomach      aspirin 81 MG Oral Tab Take 1 tablet (81 mg total) by mouth daily.         PAST MEDICAL, SOCIAL, AND FAMILY HISTORY:  Tobacco:    History    Smoking Status    Never   Smokeless Tobacco    Never       PHYSICAL EXAM:  /76 (BP Location: Left arm, Patient Position: Sitting, Cuff Size: adult)   Pulse 76   Temp 97.6 °F (36.4 °C) (Temporal)   Resp 12   Ht 5' 2.5\" (1.588 m)   Wt 166 lb 6.4 oz (75.5 kg)   LMP 01/08/2025 (Exact Date)   Breastfeeding No   BMI 29.95 kg/m²    GENERAL: well developed, well nourished,in no apparent distress  HEENT: Oropharynx normal. No tonsillar enlargement or exudates.   EYES:pupils reactive. Has some conjunctival erythema. Has mild lower eyelid swelling bilaterally. No skin changes, no erythema of the eyelids.   No orbital or periorbital pain or swelling.   NECK: no lad  LUNGS: clear to auscultation  CARDIO: RRR without murmur  EXTREMITIES:  no edema, muscle strength normal.     DATA:  Results for orders placed or performed in visit on 07/22/24   Image-Guided Pap Smear ([facility])    Collection Time: 07/22/24  5:57 PM   Result Value Ref Range    Diagnosis:        NEGATIVE FOR INTRAEPITHELIAL LESION OR MALIGNANCY.  CELLULAR CHANGES ASSOCIATED WITH INFLAMMATION ARE PRESENT.    Specimen Adequacy: Comment     Performed By: Comment     . .     Note: Comment     Test Methodology: Comment     Clinician provided ICD10: Comment    Hpv Dna  High Risk , Thin Prep Collect    Collection Time: 07/22/24  5:57 PM   Result Value Ref Range    HPV High Risk Negative Negative    HPV Source Cervical/endocervical/vaginal    ThinPrep PAP with HPV Reflex Request B    Collection Time: 07/22/24  5:57 PM   Result Value Ref Range    Thin Prep Pap AP TEST REFLEXED             ASSESSMENT AND PLAN:  1. Swelling of eyelid, unspecified laterality  Bilateral symptoms. No pain, no signs of infection.   No improvement.   This seems to be allergic in nature given watery discharge throughout the day.   Stop azelastine.   Trial olopatadine eye drops.   Trial medrol dose pack.   Continue zyrtec in the am, trial benadryl in the pm.   Do labs as below.   If  unrevealing pt to see allergist. Given referral today.   She has already seen ophthalmologist.   - olopatadine 0.1 % Ophthalmic Solution; Place 1 drop into both eyes 2 (two) times daily.  Dispense: 5 mL; Refill: 1  - methylPREDNISolone (MEDROL) 4 MG Oral Tablet Therapy Pack; As directed.  Dispense: 1 each; Refill: 0  - CBC With Differential With Platelet  - Comp Metabolic Panel (14)  - TSH [E]  - Allergy Referral - In Network        Return in about 2 weeks (around 2/25/2025).      Candida Leonard MD

## 2025-02-12 ENCOUNTER — PATIENT MESSAGE (OUTPATIENT)
Dept: INTERNAL MEDICINE CLINIC | Facility: CLINIC | Age: 45
End: 2025-02-12

## 2025-02-12 DIAGNOSIS — J30.9 ACUTE ALLERGIC RHINITIS: ICD-10-CM

## 2025-02-12 DIAGNOSIS — E03.8 SECONDARY HYPOTHYROIDISM: Primary | ICD-10-CM

## 2025-02-12 LAB
ABSOLUTE BASOPHILS: 37 CELLS/UL (ref 0–200)
ABSOLUTE EOSINOPHILS: 221 CELLS/UL (ref 15–500)
ABSOLUTE LYMPHOCYTES: 2466 CELLS/UL (ref 850–3900)
ABSOLUTE MONOCYTES: 497 CELLS/UL (ref 200–950)
ABSOLUTE NEUTROPHILS: 5980 CELLS/UL (ref 1500–7800)
ALBUMIN/GLOBULIN RATIO: 1.6 (CALC) (ref 1–2.5)
ALBUMIN: 4.2 G/DL (ref 3.6–5.1)
ALKALINE PHOSPHATASE: 81 U/L (ref 31–125)
ALT: 30 U/L (ref 6–29)
AST: 22 U/L (ref 10–30)
BASOPHILS: 0.4 %
BILIRUBIN, TOTAL: 0.2 MG/DL (ref 0.2–1.2)
BUN: 12 MG/DL (ref 7–25)
CALCIUM: 10.2 MG/DL (ref 8.6–10.2)
CARBON DIOXIDE: 27 MMOL/L (ref 20–32)
CHLORIDE: 103 MMOL/L (ref 98–110)
CREATININE: 0.77 MG/DL (ref 0.5–0.99)
EGFR: 99 ML/MIN/1.73M2
EOSINOPHILS: 2.4 %
GLOBULIN: 2.7 G/DL (CALC) (ref 1.9–3.7)
GLUCOSE: 102 MG/DL (ref 65–139)
HEMATOCRIT: 38.5 % (ref 35–45)
HEMOGLOBIN: 12.8 G/DL (ref 11.7–15.5)
LYMPHOCYTES: 26.8 %
MCH: 29.8 PG (ref 27–33)
MCHC: 33.2 G/DL (ref 32–36)
MCV: 89.7 FL (ref 80–100)
MONOCYTES: 5.4 %
MPV: 9.2 FL (ref 7.5–12.5)
NEUTROPHILS: 65 %
PLATELET COUNT: 517 THOUSAND/UL (ref 140–400)
POTASSIUM: 4.7 MMOL/L (ref 3.5–5.3)
PROTEIN, TOTAL: 6.9 G/DL (ref 6.1–8.1)
RDW: 12.3 % (ref 11–15)
RED BLOOD CELL COUNT: 4.29 MILLION/UL (ref 3.8–5.1)
SODIUM: 139 MMOL/L (ref 135–146)
TSH: 0.01 MIU/L
WHITE BLOOD CELL COUNT: 9.2 THOUSAND/UL (ref 3.8–10.8)

## 2025-02-12 RX ORDER — LEVOTHYROXINE SODIUM 25 UG/1
25 TABLET ORAL
Qty: 90 TABLET | Refills: 0 | Status: SHIPPED | OUTPATIENT
Start: 2025-02-12

## 2025-02-13 NOTE — TELEPHONE ENCOUNTER
She sent labs from 2014 to 2021. Sent for scanning. Platelets are elevated chronically but stable.

## 2025-02-13 NOTE — TELEPHONE ENCOUNTER
The labs uploaded by pt from  are in epic. I will print out the outside records for you to review if needed and scan in to record. Should she still do the steroid pack? Thanks!

## 2025-02-13 NOTE — TELEPHONE ENCOUNTER
I will review the labs and send for scanning. However any management of the elevated platelets will be through dr. Cruz.   Take the steroid pack as prescribed.

## 2025-02-24 ENCOUNTER — MED REC SCAN ONLY (OUTPATIENT)
Dept: INTERNAL MEDICINE CLINIC | Facility: CLINIC | Age: 45
End: 2025-02-24

## 2025-07-26 NOTE — PROGRESS NOTES
[facility]    CHIEF COMPLAINT:   Chief Complaint   Patient presents with    Routine Physical     Reviewed Preventative/Wellness form with patient. 7/22/24-normal pap, neg HPV. 8/6/24-mammo.     Cough     Dry cough x 1 week            The following individual(s) verbally consented to be recorded using ambient AI listening technology and understand that they can each withdraw their consent to this listening technology at any point by asking the clinician to turn off or pause the recording:    Patient name: Fernanda Agarwal  Additional names:  pt's daughter Neela          HPI:   Fernanda Agarwal is a 44 year old female who presents for a complete physical exam. Symptoms: denies discharge, itching, burning or dysuria.     Pap done 7/2024 negative with negative hpv.    LMP 6/13/25.      Mammo done 8/2024 normal.      Up to date tdap.    Get covid booster at her local pharmacy.     Exercise: walking    Derm: no skin concerns.     history of scimitar syndrome (congenital heart disease), s/p surgical repair at age 34. She is on aspirin. Sees cardiology dr. Altamirano once a year. No cardiac symptoms.      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 She was on methimazole for about 10 years. In the past 4-5 years she is now hypothyroid and on levothyroxine. Last tsh 6/2025 was normal.   Sees endocrine.      history of ruptured ectopic pregnancy in 2021. She is s/p left salpingectomy.      History of Present Illness  She has missed her menstrual period this month, which is unusual as she typically has regular cycles. There have been no recent changes in weight, stress levels, or sleep patterns. 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    She has a history of thyroid eye disease and has been receiving treatment with Tepezza at the [facility]. Since starting the treatment,  she reports improvement in her watery eyes.     She also uses Flonase for postnasal drip and has had a persistent cough for the last ten days, which is gradually improving. She takes Zyrtec daily, although it has not been as effective recently.    She has a history of congenital heart issues and is on lifelong baby aspirin therapy. She denies chest pain, shortness of breath, headaches, or dizziness. She also has a history of a past ectopic pregnancy and the absence of her left fallopian tube, but notes this has not previously affected her menstrual cycle.    In terms of lifestyle, she goes for walks but is not consistent with her exercise routine due to a busy schedule. She has not received a COVID booster in the past year.       Wt Readings from Last 6 Encounters:   07/28/25 165 lb 3.2 oz (74.9 kg)   02/11/25 166 lb 6.4 oz (75.5 kg)   02/04/25 167 lb (75.8 kg)   07/22/24 171 lb 14.4 oz (78 kg)   04/25/22 163 lb (73.9 kg)   11/12/21 160 lb 12.8 oz (72.9 kg)     Body mass index is 29.73 kg/m².       Current Medications[1]   Past Medical History[2]   Past Surgical History[3]   Family History[4]   Social History:   Short Social Hx on File[5]  : yes. Children: yes.   Exercise: minimal.  Diet: watches minimally     REVIEW OF SYSTEMS:   GENERAL: feels well otherwise  SKIN: denies any unusual skin lesions  EYES:denies blurred vision or double vision  HEENT: denies nasal congestion, sinus pain or ST  LUNGS: denies shortness of breath with exertion  CARDIOVASCULAR: denies chest pain on exertion  GI: denies abdominal pain,denies heartburn  : denies dysuria, vaginal discharge or itching,periods not regular  MUSCULOSKELETAL: denies back pain  NEURO: denies headaches  PSYCHE: denies depression or anxiety  HEMATOLOGIC: denies hx of anemia  ENDOCRINE: see hpi  ALL/ASTHMA: denies hx of allergy or asthma    EXAM:   /74 (BP Location: Left arm, Patient Position: Sitting, Cuff Size: adult)   Pulse 72   Temp 97.8 °F  (36.6 °C) (Temporal)   Resp 12   Ht 5' 2.5\" (1.588 m)   Wt 165 lb 3.2 oz (74.9 kg)   LMP 06/13/2025   Breastfeeding No   BMI 29.73 kg/m²   Body mass index is 29.73 kg/m².   GENERAL: well developed, well nourished,in no apparent distress  SKIN: no rashes,no suspicious lesions  HEENT: atraumatic, normocephalic,ears and throat are clear  EYES:PERRLA, conjunctiva are clear  NECK: supple,no adenopathy,no bruits  CHEST: no chest tenderness  LUNGS: clear to auscultation  CARDIO: nl s1 and s2, RRR without murmur  GI: good BS's,no masses, HSM or tenderness  BREAST: no dominant or suspicious mass, no nipple discharge  GENITAL/URINARY:  External Genitalia:  General appearance; normal, Hair distribution; normal, Lesions absent, Urethral Meatus:  Size normal, Location normal, Lesions absent, Prolapse absent, Vagina:  General appearance normal, Lesions absent, Pelvic support normal, Cervix:  General appearance normal, Discharge absent, Tenderness absent, Enlargement absent, Uterus:  Masses absent, Adnexa:  Masses absent, Tenderness absent, Anus/Perineum:  Lesions absent and Masses absent  No pap today.   MUSCULOSKELETAL: back is not tender,FROM of the back  EXTREMITIES: no cyanosis, clubbing or edema  NEURO: Oriented times three,cranial nerves are intact,motor and sensory are grossly intact    Labs:   Lab Results   Component Value Date/Time    WBC 9.2 02/11/2025 12:10 PM    HGB 12.8 02/11/2025 12:10 PM     (H) 02/11/2025 12:10 PM      Lab Results   Component Value Date/Time     02/11/2025 12:10 PM     02/11/2025 12:10 PM    K 4.7 02/11/2025 12:10 PM     02/11/2025 12:10 PM    CO2 27 02/11/2025 12:10 PM    CREATSERUM 0.77 02/11/2025 12:10 PM    CA 10.2 02/11/2025 12:10 PM    ALB 4.2 02/11/2025 12:10 PM    TP 6.9 02/11/2025 12:10 PM    ALKPHO 81 02/11/2025 12:10 PM    AST 22 02/11/2025 12:10 PM    ALT 30 (H) 02/11/2025 12:10 PM    BILT 0.2 02/11/2025 12:10 PM    TSH 0.01 (L) 02/11/2025 12:10 PM         No results found for: \"CHOLEST\", \"HDL\", \"TRIG\", \"LDL\", \"NONHDLC\"    No results found for: \"A1C\"   Vitamin D:    No results found for: \"VITD\"      Urine pregnancy negative.    ASSESSMENT AND PLAN:   Fernanda Agarwal is a 44 year old female who presents for a complete physical exam.   1. Physical exam, annual  Do labs.   Pelvic and breast exam done.   Mammo ordered.   Immunizations discussed.   Urged regular exercise   - CBC With Differential With Platelet  - Comp Metabolic Panel  - Lipid Panel  - Hemoglobin A1C    2. Amenorrhea  Urine pregnancy negative.   Do us pelvis.   See gyne. Given names today.   - Urine Preg Test  - US PELVIS W EV (CPT=76856/84476); Future    3. Acute cough  Improving per pt.   - benzonatate 200 MG Oral Cap; Take 1 capsule (200 mg total) by mouth 3 (three) times daily as needed.  Dispense: 45 capsule; Refill: 0    4. Encounter for screening mammogram for malignant neoplasm of breast  - [facility] 2D+3D SCREENING BILAT (CPT=77067/79467); Future    5. Secondary hypothyroidism  Continue meds per endocrine.         Return in about 1 year (around 7/28/2026) for physical.      Candida Leonard MD         [1]   Current Outpatient Medications   Medication Sig Dispense Refill    benzonatate 200 MG Oral Cap Take 1 capsule (200 mg total) by mouth 3 (three) times daily as needed. 45 capsule 0    PROBIOTIC DAILY OR Take 1 tablet by mouth as needed.      Cholecalciferol 125 MCG (5000 UT) Oral Tab Take 1 tablet (5,000 Units total) by mouth daily.      fluticasone propionate 50 MCG/ACT Nasal Suspension 2 sprays by Each Nare route daily. (Patient taking differently: 2 sprays by Each Nare route as needed.) 16 g 0    fluocinonide 0.05 % External Ointment Apply topically as needed.      Multiple Vitamins-Minerals (MULTIVITAL) Oral Tab Take 1 tablet by mouth daily with dinner.  0    cetirizine 10 MG Oral Tab Take 1 tablet (10 mg total) by mouth daily.      Vitamin B-12 1000 MCG Oral Tab Take 1 tablet (1,000 mcg total) by  mouth daily.      aspirin 81 MG Oral Tab Take 1 tablet (81 mg total) by mouth daily.      teprotumumab-trbw 500 MG Intravenous Recon Soln Inject into the vein.     [2]   Past Medical History:   ANEMIA    only post op given iron supplements for 2 mos    Collapsed lung    chronic cough and total lung collapse in ~ 2013 and evaluation during this showed a congenital abnormality in the heart     Congenital anomaly of heart (HCC)    cabg in 2014    Disorder of thyroid    Elevated platelet count    History of open heart surgery    for Scimitar syndrome    Hx of transfusion of packed red blood cells    for ruptured ectopic pregnancy     Hypothyroidism    diagnosed with hyperthyroidism in 2013 and treated with Tapazole for ~6 to 7 years, later became hypothyroid - on levothyroxine for ~3 years    Multiple thyroid nodules    Pap smear for cervical cancer screening    Negative    Ruptured left tubal ectopic pregnancy causing hemoperitoneum (HCC)    S/p methotrexate for left ectopic pregnancy on 9/2/2021 ->presented in hypovolemic shock with ruptured ectopic pregnancy 9/24/2021 -> to OR for ex-lap, left salpingectomy, evacuation of hemoperitoneum. Dr Kristel Garcia, [facility].     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 Patient underwent open heart surgery at [facility] for this.    [3]   Past Surgical History:  Procedure Laterality Date    Angiogram  09/2013    Cabg      2014    Open heart surgical profile      Open heart surgery for Scimitar Syndrome (congenital venous anomaly) at [facility]    Other surgical history  09/24/2021    exploratory laprascopy left salpingectomy, 8wks    Salpingectomy Left 09/23/2021    Ex-laparotomy, LEFT salpingectomy, evacuation of hemoperitoneum for LEFT sided ruptured ectopic pregnancy. Dr. Kristel Garcia, [facility].    [4]   Family History  Problem Relation Age of Onset    Diabetes Mother     Hypertension Mother     Diabetes Father     Hypertension Father     Obesity Sister     No Known Problems Maternal Grandmother     Diabetes Maternal Grandfather     Heart Attack Paternal Grandfather 70    No Known Problems Daughter     Thyroid disease Neg    [5]   Social History  Socioeconomic History    Marital status:     Number of children: 1   Tobacco Use    Smoking status: Never     Passive exposure: Never    Smokeless tobacco: Never   Vaping Use    Vaping status: Never Used   Substance and Sexual Activity    Alcohol use: No    Drug use: No    Sexual activity: Yes     Partners: Male   Other Topics Concern    Exercise Yes     Comment: walking     Social Drivers of Health     Food Insecurity: No Food Insecurity (7/2/2025)    Received from [facility]    Food Insecurity     Currently or in the past 3 months, have you worried your food would run out before you had money to buy more?: No     In the past 12 months, have you run out of food or been unable to get more?: No   Transportation Needs: No Transportation Needs (7/2/2025)    Received from [facility]    Transportation Needs     Currently or in the past 3 months, has lack of transportation kept you from medical appointments, getting food or medicine, or providing care to a family member?: No

## 2025-07-28 ENCOUNTER — OFFICE VISIT (OUTPATIENT)
Dept: INTERNAL MEDICINE CLINIC | Facility: CLINIC | Age: 45
End: 2025-07-28
Payer: COMMERCIAL

## 2025-07-28 VITALS
SYSTOLIC BLOOD PRESSURE: 110 MMHG | TEMPERATURE: 98 F | WEIGHT: 165.19 LBS | DIASTOLIC BLOOD PRESSURE: 74 MMHG | HEART RATE: 72 BPM | BODY MASS INDEX: 29.64 KG/M2 | RESPIRATION RATE: 12 BRPM | HEIGHT: 62.5 IN

## 2025-07-28 DIAGNOSIS — E03.8 SECONDARY HYPOTHYROIDISM: ICD-10-CM

## 2025-07-28 DIAGNOSIS — Z00.00 PHYSICAL EXAM, ANNUAL: ICD-10-CM

## 2025-07-28 DIAGNOSIS — R05.1 ACUTE COUGH: ICD-10-CM

## 2025-07-28 DIAGNOSIS — Z12.31 ENCOUNTER FOR SCREENING MAMMOGRAM FOR MALIGNANT NEOPLASM OF BREAST: ICD-10-CM

## 2025-07-28 DIAGNOSIS — N91.2 AMENORRHEA: ICD-10-CM

## 2025-07-28 LAB
CONTROL LINE PRESENT WITH A CLEAR BACKGROUND (YES/NO): YES YES/NO
KIT LOT #: NORMAL NUMERIC

## 2025-07-28 RX ORDER — BENZONATATE 200 MG/1
200 CAPSULE ORAL 3 TIMES DAILY PRN
Qty: 45 CAPSULE | Refills: 0 | Status: SHIPPED | OUTPATIENT
Start: 2025-07-28

## 2025-07-30 DIAGNOSIS — J30.9 ALLERGIC RHINITIS, UNSPECIFIED SEASONALITY, UNSPECIFIED TRIGGER: ICD-10-CM

## 2025-07-30 RX ORDER — FLUTICASONE PROPIONATE 50 MCG
2 SPRAY, SUSPENSION (ML) NASAL DAILY
Qty: 48 ML | Refills: 1 | OUTPATIENT
Start: 2025-07-30

## 2025-08-04 LAB
ABSOLUTE BASOPHILS: 82 CELLS/UL (ref 0–200)
ABSOLUTE EOSINOPHILS: 197 CELLS/UL (ref 15–500)
ABSOLUTE LYMPHOCYTES: 2804 CELLS/UL (ref 850–3900)
ABSOLUTE MONOCYTES: 574 CELLS/UL (ref 200–950)
ABSOLUTE NEUTROPHILS: 4543 CELLS/UL (ref 1500–7800)
ALBUMIN/GLOBULIN RATIO: 1.6 (CALC) (ref 1–2.5)
ALBUMIN: 4.1 G/DL (ref 3.6–5.1)
ALKALINE PHOSPHATASE: 66 U/L (ref 31–125)
ALT: 22 U/L (ref 6–29)
AST: 19 U/L (ref 10–30)
BASOPHILS: 1 %
BILIRUBIN, TOTAL: 0.4 MG/DL (ref 0.2–1.2)
BUN: 10 MG/DL (ref 7–25)
CALCIUM: 9.1 MG/DL (ref 8.6–10.2)
CARBON DIOXIDE: 24 MMOL/L (ref 20–32)
CHLORIDE: 104 MMOL/L (ref 98–110)
CHOL/HDLC RATIO: 4.9 (CALC)
CHOLESTEROL, TOTAL: 190 MG/DL
CREATININE: 0.84 MG/DL (ref 0.5–0.99)
EGFR: 89 ML/MIN/1.73M2
EOSINOPHILS: 2.4 %
GLOBULIN: 2.6 G/DL (CALC) (ref 1.9–3.7)
GLUCOSE: 89 MG/DL (ref 65–99)
HDL CHOLESTEROL: 39 MG/DL
HEMATOCRIT: 41.6 % (ref 35–45)
HEMOGLOBIN A1C: 6.2 %
HEMOGLOBIN: 13.8 G/DL (ref 11.7–15.5)
LDL-CHOLESTEROL: 123 MG/DL (CALC)
LYMPHOCYTES: 34.2 %
Lab: 107 % NORMAL (ref 70–150)
MCH: 30.6 PG (ref 27–33)
MCHC: 33.2 G/DL (ref 32–36)
MCV: 92.2 FL (ref 80–100)
MONOCYTES: 7 %
MPV: 8.7 FL (ref 7.5–12.5)
NEUTROPHILS: 55.4 %
NON-HDL CHOLESTEROL: 151 MG/DL (CALC)
PLATELET COUNT: 414 THOUSAND/UL (ref 140–400)
POTASSIUM: 4.3 MMOL/L (ref 3.5–5.3)
PROTEIN, TOTAL: 6.7 G/DL (ref 6.1–8.1)
RDW: 13.3 % (ref 11–15)
RED BLOOD CELL COUNT: 4.51 MILLION/UL (ref 3.8–5.1)
SODIUM: 138 MMOL/L (ref 135–146)
TRIGLYCERIDES: 167 MG/DL
WHITE BLOOD CELL COUNT: 8.2 THOUSAND/UL (ref 3.8–10.8)

## 2025-08-16 ENCOUNTER — HOSPITAL ENCOUNTER (OUTPATIENT)
Dept: MAMMOGRAPHY | Age: 45
Discharge: HOME | End: 2025-08-16
Attending: INTERNAL MEDICINE

## 2025-08-16 DIAGNOSIS — Z12.31 ENCOUNTER FOR SCREENING MAMMOGRAM FOR MALIGNANT NEOPLASM OF BREAST: ICD-10-CM

## 2025-08-16 PROCEDURE — 77067 SCR MAMMO BI INCL CAD: CPT | Performed by: INTERNAL MEDICINE

## 2025-08-16 PROCEDURE — 77063 BREAST TOMOSYNTHESIS BI: CPT | Performed by: INTERNAL MEDICINE

## 2025-08-22 ENCOUNTER — HOSPITAL ENCOUNTER (OUTPATIENT)
Dept: ULTRASOUND IMAGING | Age: 45
Discharge: HOME | End: 2025-08-22
Attending: INTERNAL MEDICINE

## 2025-08-22 DIAGNOSIS — N91.2 AMENORRHEA: ICD-10-CM

## 2025-08-22 PROCEDURE — 76830 TRANSVAGINAL US NON-OB: CPT | Performed by: INTERNAL MEDICINE

## 2025-08-22 PROCEDURE — 76856 US EXAM PELVIC COMPLETE: CPT | Performed by: INTERNAL MEDICINE

## (undated) DIAGNOSIS — D75.839 THROMBOCYTOSIS: Primary | ICD-10-CM

## (undated) DEVICE — SPONGE RAYTEC 4X4 RF DETECT

## (undated) DEVICE — SUTURE MONOCRYL 4-0 PS-2

## (undated) DEVICE — SUTURE PLAIN GUT 2-0 CT

## (undated) DEVICE — 3M(TM) MICROPORE TAPE DISPENSER 1535-2: Brand: 3M™ MICROPORE™

## (undated) DEVICE — ABSORBABLE HEMOSTAT (OXIDIZED REGENERATED CELLULOSE, U.S.P.): Brand: SURGICEL

## (undated) DEVICE — LIGHT HANDLE

## (undated) DEVICE — 3M™ TEGADERM™ TRANSPARENT FILM DRESSING, 1626W, 4 IN X 4-3/4 IN (10 CM X 12 CM), 50 EACH/CARTON, 4 CARTON/CASE: Brand: 3M™ TEGADERM™

## (undated) DEVICE — PANTS KNIT WASHABLE 2/3XL

## (undated) DEVICE — SUTURE VICRYL 3-0 SH

## (undated) DEVICE — SUTURE VICRYL 0

## (undated) DEVICE — SUTURE VICRYL 0 CT-1

## (undated) DEVICE — VIOLET BRAIDED (POLYGLACTIN 910), SYNTHETIC ABSORBABLE SUTURE: Brand: COATED VICRYL

## (undated) DEVICE — SOL  .9 1000ML BTL

## (undated) DEVICE — STERILE POLYISOPRENE POWDER-FREE SURGICAL GLOVES: Brand: PROTEXIS

## (undated) DEVICE — SCD SLEEVE KNEE HI BLEND

## (undated) DEVICE — SUTURE VICRYL 2-0 CT-1

## (undated) DEVICE — PAD SANITARY 10\" STERILE

## (undated) DEVICE — 1200CC GUARDIAN II: Brand: GUARDIAN

## (undated) DEVICE — LAPAROTOMY SPONGE - RF AND X-RAY DETECTABLE PRE-WASHED: Brand: SITUATE

## (undated) DEVICE — SPONGE STICK WITH PVP-I: Brand: KENDALL

## (undated) DEVICE — LAPAROTOMY CDS: Brand: MEDLINE INDUSTRIES, INC.

## (undated) NOTE — LETTER
Laurie Carr 89  [address] [facility]  Authorization for Surgical Operation and Procedure     Date:___________                                                                                                         Time:__________ and allergic reactions, hemolytic reactions, transmission of diseases such as Hepatitis, AIDS and Cytomegalovirus (CMV) and fluid overload. In the event that I wish to have an autologous transfusion of my own blood, or a directed donor transfusion.   I ramu recovery period ends for purposes of reinstating the DNAR order.   10. Patients having a sterilization procedure: I understand that if the procedure is successful the results will be permanent and it will therefore be impossible for me to inseminate, nataly do additional procedures as necessary. Some examples are: Starting or using an “IV” to give me medicine, fluids or blood during my procedure, and having a breathing tube placed to help me breathe when I’m asleep (intubation).  In the event that my heart sto complications include headache, bleeding, infection, seizure, irregular heart rhythms, and nerve injury.     I can change my mind about having anesthesia services at any time before I get the medicine.    ____________________________________________________

## (undated) NOTE — LETTER
Laurie Carr 89  [address] [facility]  Authorization for Surgical Operation and Procedure     Date:___________                                                                                                         Time:__________ and allergic reactions, hemolytic reactions, transmission of diseases such as Hepatitis, AIDS and Cytomegalovirus (CMV) and fluid overload. In the event that I wish to have an autologous transfusion of my own blood, or a directed donor transfusion.   I ramu recovery period ends for purposes of reinstating the DNAR order.   10. Patients having a sterilization procedure: I understand that if the procedure is successful the results will be permanent and it will therefore be impossible for me to inseminate, nataly do additional procedures as necessary. Some examples are: Starting or using an “IV” to give me medicine, fluids or blood during my procedure, and having a breathing tube placed to help me breathe when I’m asleep (intubation).  In the event that my heart sto complications include headache, bleeding, infection, seizure, irregular heart rhythms, and nerve injury.     I can change my mind about having anesthesia services at any time before I get the medicine.    ____________________________________________________

## (undated) NOTE — LETTER
Laurie Crar 89  [address] [facility]  Authorization for Surgical Operation and Procedure     Date:___________                                                                                                         Time:__________ and allergic reactions, hemolytic reactions, transmission of diseases such as Hepatitis, AIDS and Cytomegalovirus (CMV) and fluid overload. In the event that I wish to have an autologous transfusion of my own blood, or a directed donor transfusion.   I ramu recovery period ends for purposes of reinstating the DNAR order.   10. Patients having a sterilization procedure: I understand that if the procedure is successful the results will be permanent and it will therefore be impossible for me to inseminate, nataly do additional procedures as necessary. Some examples are: Starting or using an “IV” to give me medicine, fluids or blood during my procedure, and having a breathing tube placed to help me breathe when I’m asleep (intubation).  In the event that my heart sto complications include headache, bleeding, infection, seizure, irregular heart rhythms, and nerve injury.     I can change my mind about having anesthesia services at any time before I get the medicine.    ____________________________________________________